# Patient Record
Sex: MALE | Race: WHITE | NOT HISPANIC OR LATINO | Employment: FULL TIME | URBAN - METROPOLITAN AREA
[De-identification: names, ages, dates, MRNs, and addresses within clinical notes are randomized per-mention and may not be internally consistent; named-entity substitution may affect disease eponyms.]

---

## 2022-12-09 ENCOUNTER — OFFICE VISIT (OUTPATIENT)
Dept: FAMILY MEDICINE | Facility: CLINIC | Age: 42
End: 2022-12-09
Payer: COMMERCIAL

## 2022-12-09 VITALS
SYSTOLIC BLOOD PRESSURE: 138 MMHG | HEART RATE: 106 BPM | OXYGEN SATURATION: 98 % | WEIGHT: 284.31 LBS | DIASTOLIC BLOOD PRESSURE: 84 MMHG | BODY MASS INDEX: 37.51 KG/M2 | TEMPERATURE: 99 F | RESPIRATION RATE: 12 BRPM

## 2022-12-09 DIAGNOSIS — E66.9 OBESITY, UNSPECIFIED CLASSIFICATION, UNSPECIFIED OBESITY TYPE, UNSPECIFIED WHETHER SERIOUS COMORBIDITY PRESENT: ICD-10-CM

## 2022-12-09 DIAGNOSIS — F17.211 CIGARETTE NICOTINE DEPENDENCE IN REMISSION: ICD-10-CM

## 2022-12-09 DIAGNOSIS — Z00.00 WELLNESS EXAMINATION: Primary | ICD-10-CM

## 2022-12-09 DIAGNOSIS — R07.89 ATYPICAL CHEST PAIN: ICD-10-CM

## 2022-12-09 DIAGNOSIS — Z23 IMMUNIZATION DUE: ICD-10-CM

## 2022-12-09 PROCEDURE — 93005 ELECTROCARDIOGRAM TRACING: CPT | Mod: S$GLB,,, | Performed by: FAMILY MEDICINE

## 2022-12-09 PROCEDURE — 99999 PR PBB SHADOW E&M-EST. PATIENT-LVL III: ICD-10-PCS | Mod: PBBFAC,,, | Performed by: FAMILY MEDICINE

## 2022-12-09 PROCEDURE — 90471 TDAP VACCINE GREATER THAN OR EQUAL TO 7YO IM: ICD-10-PCS | Mod: S$GLB,,, | Performed by: FAMILY MEDICINE

## 2022-12-09 PROCEDURE — 93010 EKG 12-LEAD: ICD-10-PCS | Mod: S$GLB,,, | Performed by: INTERNAL MEDICINE

## 2022-12-09 PROCEDURE — 93010 ELECTROCARDIOGRAM REPORT: CPT | Mod: S$GLB,,, | Performed by: INTERNAL MEDICINE

## 2022-12-09 PROCEDURE — 90715 TDAP VACCINE GREATER THAN OR EQUAL TO 7YO IM: ICD-10-PCS | Mod: S$GLB,,, | Performed by: FAMILY MEDICINE

## 2022-12-09 PROCEDURE — 99999 PR PBB SHADOW E&M-EST. PATIENT-LVL III: CPT | Mod: PBBFAC,,, | Performed by: FAMILY MEDICINE

## 2022-12-09 PROCEDURE — 93005 EKG 12-LEAD: ICD-10-PCS | Mod: S$GLB,,, | Performed by: FAMILY MEDICINE

## 2022-12-09 PROCEDURE — 99386 PREV VISIT NEW AGE 40-64: CPT | Mod: 25,S$GLB,, | Performed by: FAMILY MEDICINE

## 2022-12-09 PROCEDURE — 99386 PR PREVENTIVE VISIT,NEW,40-64: ICD-10-PCS | Mod: 25,S$GLB,, | Performed by: FAMILY MEDICINE

## 2022-12-09 PROCEDURE — 90471 IMMUNIZATION ADMIN: CPT | Mod: S$GLB,,, | Performed by: FAMILY MEDICINE

## 2022-12-09 PROCEDURE — 90715 TDAP VACCINE 7 YRS/> IM: CPT | Mod: S$GLB,,, | Performed by: FAMILY MEDICINE

## 2022-12-09 NOTE — PROGRESS NOTES
" THIS DOCUMENT WAS MADE IN PART WITH VOICE RECOGNITION SOFTWARE.  OCCASIONALLY THIS SOFTWARE WILL MISINTERPRET WORDS OR PHRASES.    Assessment and Plan:    1. Wellness examination  CBC Auto Differential    Comprehensive Metabolic Panel    Hemoglobin A1C    Lipid Panel    TSH    T4, Free    Urinalysis Microscopic    Urinalysis, Reflex to Urine Culture Urine, Clean Catch    Hepatitis C Antibody    HIV 1/2 Ag/Ab (4th Gen)    EKG 12-lead      2. Immunization due  (In Office Administered) Tdap Vaccine      3. Obesity, unspecified classification, unspecified obesity type, unspecified whether serious comorbidity present        4. Cigarette nicotine dependence in remission        5. Atypical chest pain  Exercise Stress - EKG        Wellness labs as above     Tetanus vaccine today     Exercise EKG stress test for patient with atypical chest symptoms with no medical history or recent evaluations   EKG today shows normal sinus rhythm, no concerning findings for ischemic heart disease      F/u in 2 months for blood pressure evaluation. Will bring numbers in   ______________________________________________________________________  Subjective:    Chief Complaint:  Chief Complaint   Patient presents with    Establish Care        HPI:  Yung is a 42 y.o. year old     Patient presents today for wellness visit, to establish care     Chest discomfort  Started about 1 year ago, resolved, restarted about 6 months ago   Pt suspicious about covid vaccine induced heart issues   Location : right pectoral, "felt like pulled muscle"   Not associated with physical activity, worse with anxiety / stress  Improves with massaging the area   Denies SOB / Nausea / radiation of pain   Unsure of cardiovascular family history   Has been told he had high bp         Eye pathology  History of traumatic cataract, ruptured globe, episcleritis, corneal perforation, aphakia    Tobacco Dep.   Quit 18 - 20 years ago   4 py total       Past Medical History:  Past " "Medical History:   Diagnosis Date    Aphakia of right eye     Corneal perforation 9/8/13    right eye    Episcleritis of left eye 9/25/14    Pupil irregularity     Ruptured globe of right eye 9/8/13    Traumatic cataract of right eye        Past Surgical History:  Past Surgical History:   Procedure Laterality Date    CATARACT EXTRACTION      aphakia right eye    EYE SURGERY  2013    TONSILLECTOMY, ADENOIDECTOMY  y-28       Family History:  Family History   Problem Relation Age of Onset    Cancer Mother         sarcoma    Cancer Maternal Grandmother     Cancer Paternal Grandmother     Diabetes Paternal Grandmother     Diabetes Paternal Grandfather        Social History:  Social History     Socioeconomic History    Marital status:      Spouse name: Maria L    Number of children: 3   Tobacco Use    Smoking status: Former     Packs/day: 1.00     Years: 4.00     Pack years: 4.00     Types: Cigarettes   Substance and Sexual Activity    Alcohol use: Yes     Alcohol/week: 1.0 standard drink     Types: 1 Drinks containing 0.5 oz of alcohol per week     Comment: occasionaly    Drug use: No    Sexual activity: Yes     Partners: Female     Birth control/protection: None   Social History Narrative    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     No current facility-administered medications on file prior to visit.       Allergies:  Patient has no known allergies.    Immunizations:  There is no immunization history for the selected administration types on file for this patient.    Review of Systems:  Review of Systems   All other systems reviewed and are negative.    Objective:    Vitals:  Vitals:    12/09/22 0737   BP: 138/84   Pulse: 106   Resp: 12   Temp: 98.8 °F (37.1 °C)   TempSrc: Oral   SpO2: 98%   Weight: 128.9 kg (284 lb 4.5 oz)   PainSc:   1   PainLoc: Chest       Physical " Exam  Vitals reviewed.   Constitutional:       General: He is not in acute distress.  HENT:      Head: Normocephalic and atraumatic.   Eyes:      Pupils: Pupils are equal, round, and reactive to light.   Cardiovascular:      Rate and Rhythm: Normal rate and regular rhythm.      Heart sounds: No murmur heard.    No friction rub.   Pulmonary:      Effort: Pulmonary effort is normal.      Breath sounds: Normal breath sounds.   Abdominal:      General: Bowel sounds are normal. There is no distension.      Palpations: Abdomen is soft.      Tenderness: There is no abdominal tenderness.   Musculoskeletal:      Cervical back: Neck supple.   Skin:     General: Skin is warm and dry.      Findings: No rash.   Psychiatric:         Behavior: Behavior normal.           Demarco Naranjo MD  Family Medicine

## 2022-12-12 ENCOUNTER — LAB VISIT (OUTPATIENT)
Dept: LAB | Facility: HOSPITAL | Age: 42
End: 2022-12-12
Attending: FAMILY MEDICINE
Payer: COMMERCIAL

## 2022-12-12 DIAGNOSIS — Z00.00 WELLNESS EXAMINATION: ICD-10-CM

## 2022-12-12 LAB
BASOPHILS # BLD AUTO: 0.01 K/UL (ref 0–0.2)
BASOPHILS NFR BLD: 0.2 % (ref 0–1.9)
DIFFERENTIAL METHOD: ABNORMAL
EOSINOPHIL # BLD AUTO: 0.1 K/UL (ref 0–0.5)
EOSINOPHIL NFR BLD: 2.2 % (ref 0–8)
ERYTHROCYTE [DISTWIDTH] IN BLOOD BY AUTOMATED COUNT: 11.6 % (ref 11.5–14.5)
ESTIMATED AVG GLUCOSE: 105 MG/DL (ref 68–131)
HBA1C MFR BLD: 5.3 % (ref 4–5.6)
HCT VFR BLD AUTO: 42.8 % (ref 40–54)
HGB BLD-MCNC: 14.8 G/DL (ref 14–18)
HIV 1+2 AB+HIV1 P24 AG SERPL QL IA: NORMAL
IMM GRANULOCYTES # BLD AUTO: 0.03 K/UL (ref 0–0.04)
IMM GRANULOCYTES NFR BLD AUTO: 0.5 % (ref 0–0.5)
LYMPHOCYTES # BLD AUTO: 2 K/UL (ref 1–4.8)
LYMPHOCYTES NFR BLD: 32.6 % (ref 18–48)
MCH RBC QN AUTO: 31.7 PG (ref 27–31)
MCHC RBC AUTO-ENTMCNC: 34.6 G/DL (ref 32–36)
MCV RBC AUTO: 92 FL (ref 82–98)
MONOCYTES # BLD AUTO: 0.6 K/UL (ref 0.3–1)
MONOCYTES NFR BLD: 9 % (ref 4–15)
NEUTROPHILS # BLD AUTO: 3.5 K/UL (ref 1.8–7.7)
NEUTROPHILS NFR BLD: 55.5 % (ref 38–73)
NRBC BLD-RTO: 0 /100 WBC
PLATELET # BLD AUTO: 214 K/UL (ref 150–450)
PMV BLD AUTO: 11.2 FL (ref 9.2–12.9)
RBC # BLD AUTO: 4.67 M/UL (ref 4.6–6.2)
WBC # BLD AUTO: 6.23 K/UL (ref 3.9–12.7)

## 2022-12-12 PROCEDURE — 36415 COLL VENOUS BLD VENIPUNCTURE: CPT | Mod: PN | Performed by: FAMILY MEDICINE

## 2022-12-12 PROCEDURE — 87389 HIV-1 AG W/HIV-1&-2 AB AG IA: CPT | Performed by: FAMILY MEDICINE

## 2022-12-12 PROCEDURE — 84443 ASSAY THYROID STIM HORMONE: CPT | Performed by: FAMILY MEDICINE

## 2022-12-12 PROCEDURE — 83036 HEMOGLOBIN GLYCOSYLATED A1C: CPT | Performed by: FAMILY MEDICINE

## 2022-12-12 PROCEDURE — 80061 LIPID PANEL: CPT | Performed by: FAMILY MEDICINE

## 2022-12-12 PROCEDURE — 85025 COMPLETE CBC W/AUTO DIFF WBC: CPT | Performed by: FAMILY MEDICINE

## 2022-12-12 PROCEDURE — 84439 ASSAY OF FREE THYROXINE: CPT | Performed by: FAMILY MEDICINE

## 2022-12-12 PROCEDURE — 80053 COMPREHEN METABOLIC PANEL: CPT | Performed by: FAMILY MEDICINE

## 2022-12-12 PROCEDURE — 86803 HEPATITIS C AB TEST: CPT | Performed by: FAMILY MEDICINE

## 2022-12-13 LAB
ALBUMIN SERPL BCP-MCNC: 4.3 G/DL (ref 3.5–5.2)
ALP SERPL-CCNC: 80 U/L (ref 55–135)
ALT SERPL W/O P-5'-P-CCNC: 12 U/L (ref 10–44)
ANION GAP SERPL CALC-SCNC: 9 MMOL/L (ref 8–16)
AST SERPL-CCNC: 13 U/L (ref 10–40)
BILIRUB SERPL-MCNC: 0.6 MG/DL (ref 0.1–1)
BUN SERPL-MCNC: 13 MG/DL (ref 6–20)
CALCIUM SERPL-MCNC: 9.2 MG/DL (ref 8.7–10.5)
CHLORIDE SERPL-SCNC: 105 MMOL/L (ref 95–110)
CHOLEST SERPL-MCNC: 178 MG/DL (ref 120–199)
CHOLEST/HDLC SERPL: 4 {RATIO} (ref 2–5)
CO2 SERPL-SCNC: 23 MMOL/L (ref 23–29)
CREAT SERPL-MCNC: 0.8 MG/DL (ref 0.5–1.4)
EST. GFR  (NO RACE VARIABLE): >60 ML/MIN/1.73 M^2
GLUCOSE SERPL-MCNC: 89 MG/DL (ref 70–110)
HCV AB SERPL QL IA: NORMAL
HDLC SERPL-MCNC: 45 MG/DL (ref 40–75)
HDLC SERPL: 25.3 % (ref 20–50)
LDLC SERPL CALC-MCNC: 114.2 MG/DL (ref 63–159)
NONHDLC SERPL-MCNC: 133 MG/DL
POTASSIUM SERPL-SCNC: 3.8 MMOL/L (ref 3.5–5.1)
PROT SERPL-MCNC: 7.3 G/DL (ref 6–8.4)
SODIUM SERPL-SCNC: 137 MMOL/L (ref 136–145)
T4 FREE SERPL-MCNC: 0.98 NG/DL (ref 0.71–1.51)
TRIGL SERPL-MCNC: 94 MG/DL (ref 30–150)
TSH SERPL DL<=0.005 MIU/L-ACNC: 1.91 UIU/ML (ref 0.4–4)

## 2022-12-29 ENCOUNTER — CLINICAL SUPPORT (OUTPATIENT)
Dept: CARDIOLOGY | Facility: HOSPITAL | Age: 42
End: 2022-12-29
Attending: FAMILY MEDICINE
Payer: COMMERCIAL

## 2022-12-29 VITALS — BODY MASS INDEX: 37.64 KG/M2 | HEIGHT: 73 IN | WEIGHT: 284 LBS

## 2022-12-29 DIAGNOSIS — R07.89 ATYPICAL CHEST PAIN: ICD-10-CM

## 2022-12-29 LAB
CV STRESS BASE HR: 93 BPM
DIASTOLIC BLOOD PRESSURE: 84 MMHG
OHS CV CPX 1 MINUTE RECOVERY HEART RATE: 142 BPM
OHS CV CPX 85 PERCENT MAX PREDICTED HEART RATE MALE: 153
OHS CV CPX ESTIMATED METS: 8
OHS CV CPX MAX PREDICTED HEART RATE: 180
OHS CV CPX PATIENT IS FEMALE: 0
OHS CV CPX PATIENT IS MALE: 1
OHS CV CPX PEAK DIASTOLIC BLOOD PRESSURE: 86 MMHG
OHS CV CPX PEAK HEAR RATE: 171 BPM
OHS CV CPX PEAK RATE PRESSURE PRODUCT: NORMAL
OHS CV CPX PEAK SYSTOLIC BLOOD PRESSURE: 166 MMHG
OHS CV CPX PERCENT MAX PREDICTED HEART RATE ACHIEVED: 95
OHS CV CPX RATE PRESSURE PRODUCT PRESENTING: NORMAL
STRESS ECHO POST EXERCISE DUR MIN: 5 MINUTES
STRESS ECHO POST EXERCISE DUR SEC: 39 SECONDS
SYSTOLIC BLOOD PRESSURE: 125 MMHG

## 2022-12-29 PROCEDURE — 93018 EXERCISE STRESS - EKG (CUPID ONLY): ICD-10-PCS | Mod: ,,, | Performed by: INTERNAL MEDICINE

## 2022-12-29 PROCEDURE — 93016 CV STRESS TEST SUPVJ ONLY: CPT | Mod: ,,, | Performed by: INTERNAL MEDICINE

## 2022-12-29 PROCEDURE — 93016 EXERCISE STRESS - EKG (CUPID ONLY): ICD-10-PCS | Mod: ,,, | Performed by: INTERNAL MEDICINE

## 2022-12-29 PROCEDURE — 93017 CV STRESS TEST TRACING ONLY: CPT | Mod: PO

## 2022-12-29 PROCEDURE — 93018 CV STRESS TEST I&R ONLY: CPT | Mod: ,,, | Performed by: INTERNAL MEDICINE

## 2023-02-09 ENCOUNTER — OFFICE VISIT (OUTPATIENT)
Dept: FAMILY MEDICINE | Facility: CLINIC | Age: 43
End: 2023-02-09
Payer: COMMERCIAL

## 2023-02-09 VITALS
BODY MASS INDEX: 35.97 KG/M2 | SYSTOLIC BLOOD PRESSURE: 126 MMHG | HEIGHT: 73 IN | WEIGHT: 271.38 LBS | HEART RATE: 64 BPM | DIASTOLIC BLOOD PRESSURE: 86 MMHG

## 2023-02-09 DIAGNOSIS — G47.26 SHIFT WORK SLEEP DISORDER: ICD-10-CM

## 2023-02-09 DIAGNOSIS — F41.9 ANXIETY: ICD-10-CM

## 2023-02-09 DIAGNOSIS — I10 ESSENTIAL HYPERTENSION: Primary | ICD-10-CM

## 2023-02-09 PROCEDURE — 99999 PR PBB SHADOW E&M-EST. PATIENT-LVL III: ICD-10-PCS | Mod: PBBFAC,,, | Performed by: FAMILY MEDICINE

## 2023-02-09 PROCEDURE — 99214 OFFICE O/P EST MOD 30 MIN: CPT | Mod: S$GLB,,, | Performed by: FAMILY MEDICINE

## 2023-02-09 PROCEDURE — 99214 PR OFFICE/OUTPT VISIT, EST, LEVL IV, 30-39 MIN: ICD-10-PCS | Mod: S$GLB,,, | Performed by: FAMILY MEDICINE

## 2023-02-09 PROCEDURE — 99999 PR PBB SHADOW E&M-EST. PATIENT-LVL III: CPT | Mod: PBBFAC,,, | Performed by: FAMILY MEDICINE

## 2023-02-09 RX ORDER — OLMESARTAN MEDOXOMIL 20 MG/1
20 TABLET ORAL DAILY
Qty: 90 TABLET | Refills: 3 | Status: SHIPPED | OUTPATIENT
Start: 2023-02-09 | End: 2024-02-07

## 2023-02-09 RX ORDER — TRAZODONE HYDROCHLORIDE 100 MG/1
100 TABLET ORAL NIGHTLY
Qty: 30 TABLET | Refills: 11 | Status: SHIPPED | OUTPATIENT
Start: 2023-02-09 | End: 2024-02-09

## 2023-02-09 RX ORDER — ALPRAZOLAM 0.5 MG/1
0.5 TABLET ORAL DAILY PRN
Qty: 30 TABLET | Refills: 0 | Status: SHIPPED | OUTPATIENT
Start: 2023-02-09 | End: 2023-08-10

## 2023-02-09 NOTE — PROGRESS NOTES
THIS DOCUMENT WAS MADE IN PART WITH VOICE RECOGNITION SOFTWARE.  OCCASIONALLY THIS SOFTWARE WILL MISINTERPRET WORDS OR PHRASES.    Assessment and Plan:    1. Essential hypertension  olmesartan (BENICAR) 20 MG tablet      2. Shift work sleep disorder  traZODone (DESYREL) 100 MG tablet      3. Anxiety  ALPRAZolam (XANAX) 0.5 MG tablet          PLAN    New medication trazodone 100 mg to use for insomnia     New medication Xanax to use sparingly for anxiety symptoms.  May need daily medicine if use is 2 frequent     Benicar for high blood pressure.  Follow-up in 6 months.  Maintain blood pressure log.  Counseled patient on weight loss.  If patient loses 20-24 lb, he may come off blood pressure medicine      ______________________________________________________________________  Subjective:    Chief Complaint:  Chief Complaint   Patient presents with    BP follow up        HPI:  Yung is a 42 y.o. year old     Follow-up high blood pressure readings   Noted to be 135-145 on average systolic, 85-95 average diastolic  No exertional chest symptoms   EKG exercise stress test negative.  Patient suspects anxiety as a possible cause of his chest discomfort symptoms.    Also complains of sleep pathology-insomnia  Works shift work, has trouble falling asleep sometimes as result      Eye pathology  History of traumatic cataract, ruptured globe, episcleritis, corneal perforation, aphakia     Tobacco Dep.   Quit 18 - 20 years ago   4 py total     Past Medical History:  Past Medical History:   Diagnosis Date    Aphakia of right eye     Corneal perforation 9/8/13    right eye    Episcleritis of left eye 9/25/14    Pupil irregularity     Ruptured globe of right eye 9/8/13    Traumatic cataract of right eye        Past Surgical History:  Past Surgical History:   Procedure Laterality Date    CATARACT EXTRACTION      aphakia right eye    EYE SURGERY  2013    TONSILLECTOMY, ADENOIDECTOMY  y-28       Family History:  Family History   Problem  "Relation Age of Onset    Cancer Mother         sarcoma    Cancer Maternal Grandmother     Cancer Paternal Grandmother     Diabetes Paternal Grandmother     Diabetes Paternal Grandfather        Social History:  Social History     Socioeconomic History    Marital status:      Spouse name: Maria L    Number of children: 3   Tobacco Use    Smoking status: Former     Packs/day: 1.00     Years: 4.00     Pack years: 4.00     Types: Cigarettes   Substance and Sexual Activity    Alcohol use: Yes     Alcohol/week: 1.0 standard drink     Types: 1 Drinks containing 0.5 oz of alcohol per week     Comment: occasionaly    Drug use: No    Sexual activity: Yes     Partners: Female     Birth control/protection: None   Social History Narrative    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     No current facility-administered medications on file prior to visit.       Allergies:  Patient has no known allergies.    Immunizations:  Immunization History   Administered Date(s) Administered    Tdap 12/09/2022       Review of Systems:  Review of Systems   All other systems reviewed and are negative.    Objective:    Vitals:  Vitals:    02/09/23 1400   BP: 126/86   Pulse: 64   Weight: 123.1 kg (271 lb 6.2 oz)   Height: 6' 1" (1.854 m)   PainSc: 0-No pain       Physical Exam  Vitals reviewed.   Constitutional:       General: He is not in acute distress.  HENT:      Head: Normocephalic and atraumatic.   Eyes:      Pupils: Pupils are equal, round, and reactive to light.   Cardiovascular:      Rate and Rhythm: Normal rate and regular rhythm.      Heart sounds: No murmur heard.    No friction rub.   Pulmonary:      Effort: Pulmonary effort is normal.      Breath sounds: Normal breath sounds.   Abdominal:      General: Bowel sounds are normal. There is no distension.      Palpations: Abdomen is soft.      " Tenderness: There is no abdominal tenderness.   Musculoskeletal:      Cervical back: Neck supple.   Skin:     General: Skin is warm and dry.      Findings: No rash.   Psychiatric:         Behavior: Behavior normal.           Demarco Naranjo MD  Family Medicine

## 2023-06-15 ENCOUNTER — TELEPHONE (OUTPATIENT)
Dept: OPHTHALMOLOGY | Facility: CLINIC | Age: 43
End: 2023-06-15
Payer: COMMERCIAL

## 2023-06-15 ENCOUNTER — OFFICE VISIT (OUTPATIENT)
Dept: OPHTHALMOLOGY | Facility: CLINIC | Age: 43
End: 2023-06-15
Payer: COMMERCIAL

## 2023-06-15 ENCOUNTER — OFFICE VISIT (OUTPATIENT)
Dept: OPTOMETRY | Facility: CLINIC | Age: 43
End: 2023-06-15
Payer: COMMERCIAL

## 2023-06-15 DIAGNOSIS — H33.21 RETINAL DETACHMENT, RIGHT: Primary | ICD-10-CM

## 2023-06-15 DIAGNOSIS — H33.011 RETINAL DETACHMENT OF RIGHT EYE WITH SINGLE BREAK: ICD-10-CM

## 2023-06-15 PROCEDURE — 92014 COMPRE OPH EXAM EST PT 1/>: CPT | Mod: S$GLB,,, | Performed by: OPHTHALMOLOGY

## 2023-06-15 PROCEDURE — 92014 PR EYE EXAM, EST PATIENT,COMPREHESV: ICD-10-PCS | Mod: S$GLB,,, | Performed by: OPHTHALMOLOGY

## 2023-06-15 PROCEDURE — 99999 PR PBB SHADOW E&M-EST. PATIENT-LVL III: ICD-10-PCS | Mod: PBBFAC,,, | Performed by: OPHTHALMOLOGY

## 2023-06-15 PROCEDURE — 99999 PR PBB SHADOW E&M-EST. PATIENT-LVL III: ICD-10-PCS | Mod: PBBFAC,,, | Performed by: OPTOMETRIST

## 2023-06-15 PROCEDURE — 99999 PR PBB SHADOW E&M-EST. PATIENT-LVL III: CPT | Mod: PBBFAC,,, | Performed by: OPTOMETRIST

## 2023-06-15 PROCEDURE — 92004 COMPRE OPH EXAM NEW PT 1/>: CPT | Mod: S$GLB,,, | Performed by: OPTOMETRIST

## 2023-06-15 PROCEDURE — 99999 PR PBB SHADOW E&M-EST. PATIENT-LVL III: CPT | Mod: PBBFAC,,, | Performed by: OPHTHALMOLOGY

## 2023-06-15 PROCEDURE — 92004 PR EYE EXAM, NEW PATIENT,COMPREHESV: ICD-10-PCS | Mod: S$GLB,,, | Performed by: OPTOMETRIST

## 2023-06-15 RX ORDER — AMOXICILLIN 875 MG/1
875 TABLET, FILM COATED ORAL EVERY 12 HOURS
COMMUNITY
Start: 2023-06-08 | End: 2023-08-10

## 2023-06-15 NOTE — TELEPHONE ENCOUNTER
Pt states 1 wk ago had pain flashes and weird vision.  Yesterday bottom large black spot 1/4 of VA dark, this morning 1/2 of vision waving and dark.  Pt told to come in immediately.         ----- Message from Blanca Liang sent at 6/15/2023  2:20 PM CDT -----  Regarding: Same Day Appointment Request  Contact: patient at [phone]  Type:  Same Day Appointment Request    Caller is requesting a same day appointment.  Caller declined first available appointment listed below.      Name of Caller:  patient at [phone]    When is the first available appointment?  6/21  Symptoms:  loss of vision in right eye    Additional Information:   patient would like to be seen before the weekend. Please call and advise. Thank you\

## 2023-06-15 NOTE — PROGRESS NOTES
HPI    DLS about 6mos ago    Pt states 2 wks ago had pain  with flashes around a [place].  Couple of   days later noticed a big floater. But all went away.  Yesterday saw a dark   spot 1/4 lower area with flashes. This am 1/2 of VA is gone, with the area   moving around.       Previous to this no flashes noted and did not extreme exertion.   Last edited by Paulina Jackson on 6/15/2023  3:39 PM.            Assessment /Plan     For exam results, see Encounter Report.    Retinal detachment, right      Large superior x 1 day history of signif vision loss, symptoms started 1 week ago, RTC with retina for eval

## 2023-06-15 NOTE — PROGRESS NOTES
HPI    RD Per Dr Mello      OCT - OD - bullous RD over fovea -        Prior OCT nerve - some infero temporal thinning OS    Prior HVF - Full, though fixation losses OD due to aphakia    A/P      1-25g PPV/PPL/open globe revision OD 9/11/13   -For Traumatic cataract and presumed IOFB.  No IOFB found    6/23 - 1 day h/o inferior visual field loss  Bullous ST RD with shallow temporal macular SRF    Plan urgent repair - good pneumatic candidate as prior PPV  Will discuss with Dr. Laguerre.        2-Ocular hypertension OS:   In past - had JDN eval    3-Aphakic contact lens OD:   -20/40 BCVA in past with toric CL

## 2023-06-16 ENCOUNTER — OFFICE VISIT (OUTPATIENT)
Dept: OPHTHALMOLOGY | Facility: CLINIC | Age: 43
End: 2023-06-16
Payer: COMMERCIAL

## 2023-06-16 DIAGNOSIS — H27.01 APHAKIA OF RIGHT EYE: ICD-10-CM

## 2023-06-16 DIAGNOSIS — H33.011 RETINAL DETACHMENT OF RIGHT EYE WITH SINGLE BREAK: Primary | ICD-10-CM

## 2023-06-16 DIAGNOSIS — H40.052 OCULAR HYPERTENSION OF LEFT EYE: ICD-10-CM

## 2023-06-16 PROCEDURE — 67110 REPAIR DETACHED RETINA: CPT | Mod: RT,S$GLB,, | Performed by: OPHTHALMOLOGY

## 2023-06-16 PROCEDURE — 92201 PR OPHTHALMOSCOPY, EXT, W/RET DRAW/SCLERAL DEPR, I&R, UNI/BI: ICD-10-PCS | Mod: 59,S$GLB,, | Performed by: OPHTHALMOLOGY

## 2023-06-16 PROCEDURE — 92134 CPTRZ OPH DX IMG PST SGM RTA: CPT | Mod: S$GLB,,, | Performed by: OPHTHALMOLOGY

## 2023-06-16 PROCEDURE — 67208 PR DESTRUC RETINAL LESN,CRYOTHERAPY: ICD-10-PCS | Mod: 51,RT,S$GLB, | Performed by: OPHTHALMOLOGY

## 2023-06-16 PROCEDURE — 99215 OFFICE O/P EST HI 40 MIN: CPT | Mod: 57,S$GLB,, | Performed by: OPHTHALMOLOGY

## 2023-06-16 PROCEDURE — 92201 OPSCPY EXTND RTA DRAW UNI/BI: CPT | Mod: 59,S$GLB,, | Performed by: OPHTHALMOLOGY

## 2023-06-16 PROCEDURE — 67208 TREATMENT OF RETINAL LESION: CPT | Mod: 51,RT,S$GLB, | Performed by: OPHTHALMOLOGY

## 2023-06-16 PROCEDURE — 99999 PR PBB SHADOW E&M-EST. PATIENT-LVL III: CPT | Mod: PBBFAC,,, | Performed by: OPHTHALMOLOGY

## 2023-06-16 PROCEDURE — 99999 PR PBB SHADOW E&M-EST. PATIENT-LVL III: ICD-10-PCS | Mod: PBBFAC,,, | Performed by: OPHTHALMOLOGY

## 2023-06-16 PROCEDURE — 67110 PR REPAIR DETACH RETINA,INJECT AIR/GAS: ICD-10-PCS | Mod: RT,S$GLB,, | Performed by: OPHTHALMOLOGY

## 2023-06-16 PROCEDURE — 99215 PR OFFICE/OUTPT VISIT, EST, LEVL V, 40-54 MIN: ICD-10-PCS | Mod: 57,S$GLB,, | Performed by: OPHTHALMOLOGY

## 2023-06-16 PROCEDURE — 92134 OCT, RETINA - OU - BOTH EYES: ICD-10-PCS | Mod: S$GLB,,, | Performed by: OPHTHALMOLOGY

## 2023-06-16 RX ORDER — DORZOLAMIDE HYDROCHLORIDE AND TIMOLOL MALEATE 20; 5 MG/ML; MG/ML
1 SOLUTION/ DROPS OPHTHALMIC 2 TIMES DAILY
Qty: 10 ML | Refills: 1 | Status: SHIPPED | OUTPATIENT
Start: 2023-06-16 | End: 2023-08-10

## 2023-06-16 RX ORDER — ACETAMINOPHEN 500 MG
500 TABLET ORAL
Status: SHIPPED | OUTPATIENT
Start: 2023-06-16

## 2023-06-16 RX ORDER — PREDNISOLONE ACETATE 10 MG/ML
1 SUSPENSION/ DROPS OPHTHALMIC EVERY 4 HOURS
Qty: 5 ML | Refills: 0 | Status: SHIPPED | OUTPATIENT
Start: 2023-06-16 | End: 2023-08-10

## 2023-06-16 RX ORDER — OFLOXACIN 3 MG/ML
1 SOLUTION/ DROPS OPHTHALMIC EVERY 4 HOURS
Qty: 5 ML | Refills: 0 | Status: SHIPPED | OUTPATIENT
Start: 2023-06-16 | End: 2023-06-26

## 2023-06-16 RX ORDER — ACETAMINOPHEN 500 MG
1000 TABLET ORAL EVERY 6 HOURS PRN
Qty: 100 TABLET | Refills: 2 | Status: ON HOLD | OUTPATIENT
Start: 2023-06-16 | End: 2023-08-07 | Stop reason: HOSPADM

## 2023-06-16 NOTE — PROGRESS NOTES
HPI    New Patient- ref by Dr. Mello/Elizabeth    RD OD    Patient here today with c/o obstructed VA OD, no pain, floaters and   flashes OD.  Last edited by Ruth Ann Rosado on 6/16/2023  9:07 AM.         A/P    ICD-10-CM ICD-9-CM   1. Retinal detachment of right eye with single break  H33.011 361.01   2. Aphakia of right eye  H27.01 379.31   3. Ocular hypertension of left eye  H40.052 365.04       1. Retinal detachment of right eye with single break  2. Aphakia of right eye  Hx of globe injury (glass injury?), s/p PPV/PPL/open globe revision OD (2013)  Presents with 1-2 days of blurred vision and finding of mac off RD  VA HM, bullous detachment with focal break at 10oclock with      Plan: discussed options of vitrectomy versus cryopexy/pneumatic retinopexy , pt wishes to proceed with cryopexy/pneumatic retinopexy .    Based on todays exam, diagnostic studies, and review of records, the determination was made for treatment today.  Schedulecryopexy/pneumatic retinopexy today Right Eye Patient chooses to proceed with R/B/A discussed patient elects to proceed    Procedure Note: cryopexy/pneumatic retinopexy  Right eye    Time out performed and patient and site identified    Preoperative Diagnosis: Rhegmatogenous Retinal Detachment Right Eye  Anesthesia: topical  Prep: Topical properacaine then 10% Betadine to lids, lashes, conjunctiva  Subconjunctival Lidocaine 2%    A/C paracentesis performed with Tb syringe and 30g needle.  Then 100% C3F8 gas, 0.5 cc, injected 3.5-4.0 mm posterior to limbus inferotemporally.  IOP acceptable, central retinal artery patent, ONH perfused     The patient remained face down for 1 hour and then Cryotherapy was applied to breaks under indirect ophthalmoscope visualization    Post op diagnosis: same  Complications: none  Follow-up Monday 9 AM    Start PF/Ocuflox QID  FACE DOWN UNTIL MONDAY    3. Ocular hypertension of left eye  Hx elevated IOP, today 18  Plan: Observation off gtt    RTC  Monday 9 AM DFE OD, possible laser OD    I saw and examined the patient and reviewed in detail the findings documented. The final examination findings, image interpretations, and plan as documented in the record represent my personal judgment and conclusions.    Han Laguerre MD  Vitreoretinal Surgery   [facility]

## 2023-06-19 ENCOUNTER — OFFICE VISIT (OUTPATIENT)
Dept: OPHTHALMOLOGY | Facility: CLINIC | Age: 43
End: 2023-06-19
Payer: COMMERCIAL

## 2023-06-19 ENCOUNTER — PATIENT MESSAGE (OUTPATIENT)
Dept: OPHTHALMOLOGY | Facility: CLINIC | Age: 43
End: 2023-06-19

## 2023-06-19 DIAGNOSIS — H33.011 RETINAL DETACHMENT OF RIGHT EYE WITH SINGLE BREAK: Primary | ICD-10-CM

## 2023-06-19 PROCEDURE — 99024 POSTOP FOLLOW-UP VISIT: CPT | Mod: S$GLB,,, | Performed by: OPHTHALMOLOGY

## 2023-06-19 PROCEDURE — 99999 PR PBB SHADOW E&M-EST. PATIENT-LVL II: ICD-10-PCS | Mod: PBBFAC,,, | Performed by: OPHTHALMOLOGY

## 2023-06-19 PROCEDURE — 99024 PR POST-OP FOLLOW-UP VISIT: ICD-10-PCS | Mod: S$GLB,,, | Performed by: OPHTHALMOLOGY

## 2023-06-19 PROCEDURE — 99999 PR PBB SHADOW E&M-EST. PATIENT-LVL II: CPT | Mod: PBBFAC,,, | Performed by: OPHTHALMOLOGY

## 2023-06-19 NOTE — PROGRESS NOTES
HPI    DLS: 06/16/2023 for retinal detachment  right eye  with single break   Had cryopexy/pneumatic retinopexy OD same day.    Pt returns today for follow up     Current medication: Pf every 4 hours, Oflocacin 0.3% every 4 hours   And Cosopt bid OD        Patient reports that his vision is still very blurry but he can see light.   And OD is only sensitive to touch  Last edited by Marley White on 6/19/2023  8:59 AM.         A/P    ICD-10-CM ICD-9-CM   1. Retinal detachment of right eye with single break  H33.011 361.01       1. Retinal detachment of right eye with single break  2. Aphakia of right eye  Hx of globe injury (glass injury?), s/p PPV/PPL/open globe revision OD (2013)    Initial Visit: Presents with 1-2 days of blurred vision and finding of mac off RD  VA HM, bullous detachment with focal break at 10oclock with      S/p Cryo/Pneumo 6/16/23    Today 6/19/2023   VA CF aphakic, IOP good, good gas fill, retina flat at break and macula attached, early cryo changes at 10oclock, inferior displaced SRF no new RT/RD    Given some early cryo changes and how bullous RD was, recommend laser retinopexy to supplement retinal break   Based on todays exam, diagnostic studies, and review of records, the determination was made for treatment today.  Schedule laser retinopexy today Right Eye Patient chooses to proceed with laser R/B/A discussed patient elects to proceed    Patient identified.  Timeout performed.    Laser Procedure Note  Laser retinopexy Right Eye  Anesthesia: topical Proparacaine  Complications: none  Size: 200 microns  Duration: 80 ms  Power: 300  Number: 432  Good laser uptake, no complications  F/u as above, RTC sooner PRN       Can d/c PF/Ocuflox and cosopt  Head upright tilted to left       RD precautions discussed in detail, patient expressed understanding  RTC immediately PRN (especially ANY change flashes, floaters, vision, visual field)       3. Ocular hypertension of left eye  Hx  elevated IOP, today 15  Plan: Observation off gtt    RTC 1 week DFE/OCTm OD     I saw and examined the patient and reviewed in detail the findings documented. The final examination findings, image interpretations, and plan as documented in the record represent my personal judgment and conclusions.    Han Laguerre MD  Vitreoretinal Surgery   [facility]

## 2023-06-20 ENCOUNTER — PATIENT MESSAGE (OUTPATIENT)
Dept: OPHTHALMOLOGY | Facility: CLINIC | Age: 43
End: 2023-06-20
Payer: COMMERCIAL

## 2023-07-05 NOTE — PROGRESS NOTES
HPI     1 month f/u  RD  OD     Additional comments: RD  IN OD    CORNEAL PERFORATION  OD  TRAMATIC CATARACT OD  PT ONLY WANTED OD CHECKED AND DILATION HE DROVE HIMSELF     EYE MEDS   NONE           Comments    DLS    06/19/2023      POHX   APHAKIA OD   RD OD  S/P PNEUMO/CRYO  06/16/2023  HM  FOCAL BREAK LAST VISIT     OCT  TODAY   DFE OD            Last edited by Kassy Zaragoza on 7/6/2023  1:19 PM.         A/P    ICD-10-CM ICD-9-CM   1. Retinal detachment of right eye with single break  H33.011 361.01   2. Aphakia of right eye  H27.01 379.31   3. Ocular hypertension of left eye  H40.052 365.04         1. Retinal detachment of right eye with single break  2. Aphakia of right eye  Hx of globe injury (glass injury?), s/p PPV/PPL/open globe revision OD (2013)    Initial Visit: Presents with 1-2 days of blurred vision and finding of mac off RD  VA HM, bullous detachment with focal break at 10oclock with      S/p Cryo/Pneumo 6/16/23  S/p supplemental laser 6/19/23    Today 7/5/2023   VA CF (stable), IOP 16, retina detached, break at 10oclock slightly elevated with cryo changes, no new RT/RD with     Plan: discussed options of vitrectomy vs repeat pneumatic/cryo, pt wishes to proceed with pneumatic/cryo    Based on todays exam, diagnostic studies, and review of records, the determination was made for treatment today.  Schedule Pneumatic Retinopexy/Cryopexy today Right Eye Patient chooses to proceed with R/B/A discussed patient elects to proceed        Procedure Note: Pneumatic Retinopexy, Right eye    Preoperative Diagnosis: Rhegmatogenous Retinal Detachment Right Eye  Anesthesia: topical  Prep: Topical properacaine then 10% Betadine to lids, lashes, conjunctiva  Subconjunctival Lidocaine 2%  Cryotherapy applied to breaks under indirect ophthalmoscope visualization  A/C paracentesis performed with Tb syringe and 30g needle.  Then 100% C3F8 gas, 0.4 cc, injected 3.5-4.0 mm posterior to limbus inferotemporally.  IOP  26, central retinal artery patent, ONH perfused after procedure.  Post op diagnosis: same  Complications: none  Follow-up Monday     FACE DOWN        Restart PF/Ocuflox          RD precautions discussed in detail, patient expressed understanding  RTC immediately PRN (especially ANY change flashes, floaters, vision, visual field)       3. Ocular hypertension of left eye  Hx elevated IOP, today 15  Plan: Observation off gtt    RTC Monday DFE/OCTm OD      I saw and examined the patient and reviewed in detail the findings documented. The final examination findings, image interpretations, and plan as documented in the record represent my personal judgment and conclusions.    Han Laguerre MD  Vitreoretinal Surgery   [facility]

## 2023-07-06 ENCOUNTER — OFFICE VISIT (OUTPATIENT)
Dept: OPHTHALMOLOGY | Facility: CLINIC | Age: 43
End: 2023-07-06
Payer: COMMERCIAL

## 2023-07-06 DIAGNOSIS — H27.01 APHAKIA OF RIGHT EYE: ICD-10-CM

## 2023-07-06 DIAGNOSIS — H33.011 RETINAL DETACHMENT OF RIGHT EYE WITH SINGLE BREAK: Primary | ICD-10-CM

## 2023-07-06 DIAGNOSIS — H40.052 OCULAR HYPERTENSION OF LEFT EYE: ICD-10-CM

## 2023-07-06 PROCEDURE — 99024 PR POST-OP FOLLOW-UP VISIT: ICD-10-PCS | Mod: S$GLB,,, | Performed by: OPHTHALMOLOGY

## 2023-07-06 PROCEDURE — 92134 CPTRZ OPH DX IMG PST SGM RTA: CPT | Mod: S$GLB,,, | Performed by: OPHTHALMOLOGY

## 2023-07-06 PROCEDURE — 67110 REPAIR DETACHED RETINA: CPT | Mod: 58,RT,S$GLB, | Performed by: OPHTHALMOLOGY

## 2023-07-06 PROCEDURE — 99999 PR PBB SHADOW E&M-EST. PATIENT-LVL III: CPT | Mod: PBBFAC,,, | Performed by: OPHTHALMOLOGY

## 2023-07-06 PROCEDURE — 67110 PR REPAIR DETACH RETINA,INJECT AIR/GAS: ICD-10-PCS | Mod: 58,RT,S$GLB, | Performed by: OPHTHALMOLOGY

## 2023-07-06 PROCEDURE — 99999 PR PBB SHADOW E&M-EST. PATIENT-LVL III: ICD-10-PCS | Mod: PBBFAC,,, | Performed by: OPHTHALMOLOGY

## 2023-07-06 PROCEDURE — 99024 POSTOP FOLLOW-UP VISIT: CPT | Mod: S$GLB,,, | Performed by: OPHTHALMOLOGY

## 2023-07-06 PROCEDURE — 92134 OCT, RETINA - OU - BOTH EYES: ICD-10-PCS | Mod: S$GLB,,, | Performed by: OPHTHALMOLOGY

## 2023-07-08 ENCOUNTER — PATIENT MESSAGE (OUTPATIENT)
Dept: OPHTHALMOLOGY | Facility: CLINIC | Age: 43
End: 2023-07-08
Payer: COMMERCIAL

## 2023-07-10 ENCOUNTER — TELEPHONE (OUTPATIENT)
Dept: OPHTHALMOLOGY | Facility: CLINIC | Age: 43
End: 2023-07-10
Payer: COMMERCIAL

## 2023-07-10 ENCOUNTER — OFFICE VISIT (OUTPATIENT)
Dept: OPHTHALMOLOGY | Facility: CLINIC | Age: 43
End: 2023-07-10
Payer: COMMERCIAL

## 2023-07-10 DIAGNOSIS — H40.052 OCULAR HYPERTENSION OF LEFT EYE: ICD-10-CM

## 2023-07-10 DIAGNOSIS — H27.01 APHAKIA OF RIGHT EYE: ICD-10-CM

## 2023-07-10 DIAGNOSIS — H33.011 RETINAL DETACHMENT OF RIGHT EYE WITH SINGLE BREAK: Primary | ICD-10-CM

## 2023-07-10 PROCEDURE — 92134 OCT, RETINA - OU - BOTH EYES: ICD-10-PCS | Mod: S$GLB,,, | Performed by: OPHTHALMOLOGY

## 2023-07-10 PROCEDURE — 99999 PR PBB SHADOW E&M-EST. PATIENT-LVL III: CPT | Mod: PBBFAC,,, | Performed by: OPHTHALMOLOGY

## 2023-07-10 PROCEDURE — 92134 CPTRZ OPH DX IMG PST SGM RTA: CPT | Mod: S$GLB,,, | Performed by: OPHTHALMOLOGY

## 2023-07-10 PROCEDURE — 99024 POSTOP FOLLOW-UP VISIT: CPT | Mod: S$GLB,,, | Performed by: OPHTHALMOLOGY

## 2023-07-10 PROCEDURE — 99999 PR PBB SHADOW E&M-EST. PATIENT-LVL III: ICD-10-PCS | Mod: PBBFAC,,, | Performed by: OPHTHALMOLOGY

## 2023-07-10 PROCEDURE — 99024 PR POST-OP FOLLOW-UP VISIT: ICD-10-PCS | Mod: S$GLB,,, | Performed by: OPHTHALMOLOGY

## 2023-07-10 NOTE — PROGRESS NOTES
HPI    1 wk DFE OD / OCTm     DLS  07/06/2023 by Dr. JASON Laguerre MD      CC: pt reports OD is a little sore since Cryo procedure.  I due have   fun-house appearance OD     --diplopia  --flashes/--floaters  ++headaches qna  -curtain/shadows/veils      POHX:    1. APHAKIA OD     2. RD OD  S/P PNEUMO/CRYO  06/16/2023    3. HM  FOCAL BREAK LAST VISIT          Last edited by Dominique Barker MA on 7/10/2023 12:25 PM.           A/P    ICD-10-CM ICD-9-CM   1. Retinal detachment of right eye with single break  H33.011 361.01   2. Aphakia of right eye  H27.01 379.31   3. Ocular hypertension of left eye  H40.052 365.04       1. Retinal detachment of right eye with single break  2. Aphakia of right eye  Hx of globe injury (glass injury?), s/p PPV/PPL/open globe revision OD (2013)    Initial Visit: Presents with 1-2 days of blurred vision and finding of mac off RD  VA HM, bullous detachment with focal break at 10oclock with      S/p Cryo/Pneumo 6/16/23  S/p supplemental laser 6/19/23  S/p Cryo/Pneumo 7/6/23    Today 7/10/2023   VA CF (stable), IOP 18, macula flat, break at 10oclock FLAT with cryo changes, no new RT/RD with , displaced SRF inf no new RT/RD    Plan:   observe, good gas fill, retina improved, continue Head tilted to left slightly        Can stop PF/Ocuflox          RD precautions discussed in detail, patient expressed understanding  RTC immediately PRN (especially ANY change flashes, floaters, vision, visual field)       3. Ocular hypertension of left eye  Hx elevated IOP  Plan: Observation off gtt    RTC Wed DFE  OD      I saw and examined the patient and reviewed in detail the findings documented. The final examination findings, image interpretations, and plan as documented in the record represent my personal judgment and conclusions.    Han Laguerre MD  Vitreoretinal Surgery   [facility]

## 2023-07-10 NOTE — TELEPHONE ENCOUNTER
Spoke to pt and scheduled appt       ----- Message from Anaid Mcnamara sent at 7/10/2023  1:02 PM CDT -----  Regarding: appt  Dr. Laguerre would like to sept again 7/12/23. DFE OS.

## 2023-07-12 ENCOUNTER — OFFICE VISIT (OUTPATIENT)
Dept: OPHTHALMOLOGY | Facility: CLINIC | Age: 43
End: 2023-07-12
Payer: COMMERCIAL

## 2023-07-12 DIAGNOSIS — H27.01 APHAKIA OF RIGHT EYE: ICD-10-CM

## 2023-07-12 DIAGNOSIS — H40.052 OCULAR HYPERTENSION OF LEFT EYE: ICD-10-CM

## 2023-07-12 DIAGNOSIS — H33.011 RETINAL DETACHMENT OF RIGHT EYE WITH SINGLE BREAK: Primary | ICD-10-CM

## 2023-07-12 PROCEDURE — 99024 POSTOP FOLLOW-UP VISIT: CPT | Mod: S$GLB,,, | Performed by: OPHTHALMOLOGY

## 2023-07-12 PROCEDURE — 99999 PR PBB SHADOW E&M-EST. PATIENT-LVL III: ICD-10-PCS | Mod: PBBFAC,,, | Performed by: OPHTHALMOLOGY

## 2023-07-12 PROCEDURE — 99999 PR PBB SHADOW E&M-EST. PATIENT-LVL III: CPT | Mod: PBBFAC,,, | Performed by: OPHTHALMOLOGY

## 2023-07-12 PROCEDURE — 99024 PR POST-OP FOLLOW-UP VISIT: ICD-10-PCS | Mod: S$GLB,,, | Performed by: OPHTHALMOLOGY

## 2023-07-12 NOTE — PROGRESS NOTES
HPI    Pt here for 1 wk DFE    States OD is improved in vision, slight pain off and on 2/10.  Off gtts as instructed  Denies headaches, floaters/flashes  Last edited by Jenny Jacobson on 7/12/2023  9:12 AM.         A/P    ICD-10-CM ICD-9-CM   1. Retinal detachment of right eye with single break  H33.011 361.01   2. Aphakia of right eye  H27.01 379.31   3. Ocular hypertension of left eye  H40.052 365.04         1. Retinal detachment of right eye with single break  2. Aphakia of right eye  Hx of globe injury (glass injury?), s/p PPV/PPL/open globe revision OD (2013)    Initial Visit: Presents with 1-2 days of blurred vision and finding of mac off RD  VA HM, bullous detachment with focal break at 10oclock with      S/p Cryo/Pneumo 6/16/23  S/p supplemental laser 6/19/23  S/p Cryo/Pneumo 7/6/23    Today 7/12/2023   VA CF (stable), IOP 21, macula flat, break at 10oclock FLAT with cryo changes, no new RT/RD , improving displaced SRF inf no new RT/RD    Plan:   observe, good gas fill, retina improved, continue Head tilted to left slightly       RD precautions discussed in detail, patient expressed understanding  RTC immediately PRN (especially ANY change flashes, floaters, vision, visual field)       3. Ocular hypertension of left eye  Hx elevated IOP  Plan: Observation off gtt      RTC 1 week DFE  OD      I saw and examined the patient and reviewed in detail the findings documented. The final examination findings, image interpretations, and plan as documented in the record represent my personal judgment and conclusions.    Han Laguerre MD  Vitreoretinal Surgery   [facility]

## 2023-07-19 ENCOUNTER — OFFICE VISIT (OUTPATIENT)
Dept: OPHTHALMOLOGY | Facility: CLINIC | Age: 43
End: 2023-07-19
Payer: COMMERCIAL

## 2023-07-19 DIAGNOSIS — H27.01 APHAKIA OF RIGHT EYE: ICD-10-CM

## 2023-07-19 DIAGNOSIS — H33.011 RETINAL DETACHMENT OF RIGHT EYE WITH SINGLE BREAK: Primary | ICD-10-CM

## 2023-07-19 DIAGNOSIS — H40.052 OCULAR HYPERTENSION OF LEFT EYE: ICD-10-CM

## 2023-07-19 PROCEDURE — 99999 PR PBB SHADOW E&M-EST. PATIENT-LVL II: CPT | Mod: PBBFAC,,, | Performed by: OPHTHALMOLOGY

## 2023-07-19 PROCEDURE — 99999 PR PBB SHADOW E&M-EST. PATIENT-LVL II: ICD-10-PCS | Mod: PBBFAC,,, | Performed by: OPHTHALMOLOGY

## 2023-07-19 PROCEDURE — 99024 POSTOP FOLLOW-UP VISIT: CPT | Mod: S$GLB,,, | Performed by: OPHTHALMOLOGY

## 2023-07-19 PROCEDURE — 99024 PR POST-OP FOLLOW-UP VISIT: ICD-10-PCS | Mod: S$GLB,,, | Performed by: OPHTHALMOLOGY

## 2023-07-19 NOTE — PROGRESS NOTES
HPI    Pt presents for one week post op following RD repair OD     States no new ocular complaints    No ocular meds     Last edited by Lawanda Pederson on 7/19/2023  1:19 PM.          A/P    ICD-10-CM ICD-9-CM   1. Retinal detachment of right eye with single break  H33.011 361.01   2. Aphakia of right eye  H27.01 379.31   3. Ocular hypertension of left eye  H40.052 365.04           1. Retinal detachment of right eye with single break  2. Aphakia of right eye  Hx of globe injury (glass injury?), s/p PPV/PPL/open globe revision OD (2013)    Initial Visit: Presents with 1-2 days of blurred vision and finding of mac off RD  VA HM, bullous detachment with focal break at 10oclock with      S/p Cryo/Pneumo 6/16/23  S/p supplemental laser 6/19/23  S/p Cryo/Pneumo 7/6/23    Today 7/19/2023   VA CF (stable), IOP 21, macula flat, break at 10oclock FLAT with cryo changes, no new RT/RD , better inf displaced SRF inf no new RT/RD    Plan:   observe, good gas fill, retina improved, continue Head tilted to left slightly       RD precautions discussed in detail, patient expressed understanding  RTC immediately PRN (especially ANY change flashes, floaters, vision, visual field)       3. Ocular hypertension of left eye  Hx elevated IOP  Plan: Observation off gtt      RTC 2 week DFE  OD      I saw and examined the patient and reviewed in detail the findings documented. The final examination findings, image interpretations, and plan as documented in the record represent my personal judgment and conclusions.    Han Laguerre MD  Vitreoretinal Surgery   [facility]

## 2023-08-02 ENCOUNTER — OFFICE VISIT (OUTPATIENT)
Dept: OPHTHALMOLOGY | Facility: CLINIC | Age: 43
End: 2023-08-02
Payer: COMMERCIAL

## 2023-08-02 DIAGNOSIS — H33.011 RETINAL DETACHMENT OF RIGHT EYE WITH SINGLE BREAK: Primary | ICD-10-CM

## 2023-08-02 DIAGNOSIS — H40.052 OCULAR HYPERTENSION OF LEFT EYE: ICD-10-CM

## 2023-08-02 DIAGNOSIS — H27.01 APHAKIA OF RIGHT EYE: ICD-10-CM

## 2023-08-02 PROCEDURE — 92134 OCT, RETINA - OU - BOTH EYES: ICD-10-PCS | Mod: S$GLB,,, | Performed by: OPHTHALMOLOGY

## 2023-08-02 PROCEDURE — 99999 PR PBB SHADOW E&M-EST. PATIENT-LVL III: CPT | Mod: PBBFAC,,, | Performed by: OPHTHALMOLOGY

## 2023-08-02 PROCEDURE — 99214 PR OFFICE/OUTPT VISIT, EST, LEVL IV, 30-39 MIN: ICD-10-PCS | Mod: 24,S$GLB,, | Performed by: OPHTHALMOLOGY

## 2023-08-02 PROCEDURE — 92134 CPTRZ OPH DX IMG PST SGM RTA: CPT | Mod: S$GLB,,, | Performed by: OPHTHALMOLOGY

## 2023-08-02 PROCEDURE — 99999 PR PBB SHADOW E&M-EST. PATIENT-LVL III: ICD-10-PCS | Mod: PBBFAC,,, | Performed by: OPHTHALMOLOGY

## 2023-08-02 PROCEDURE — 99214 OFFICE O/P EST MOD 30 MIN: CPT | Mod: 24,S$GLB,, | Performed by: OPHTHALMOLOGY

## 2023-08-02 RX ORDER — MOXIFLOXACIN 5 MG/ML
1 SOLUTION/ DROPS OPHTHALMIC
Status: CANCELLED | OUTPATIENT
Start: 2023-08-02

## 2023-08-02 RX ORDER — TETRACAINE HYDROCHLORIDE 5 MG/ML
1 SOLUTION OPHTHALMIC
Status: CANCELLED | OUTPATIENT
Start: 2023-08-02

## 2023-08-02 RX ORDER — CYCLOPENTOLATE HYDROCHLORIDE 10 MG/ML
1 SOLUTION/ DROPS OPHTHALMIC
Status: CANCELLED | OUTPATIENT
Start: 2023-08-02

## 2023-08-02 RX ORDER — PHENYLEPHRINE HYDROCHLORIDE 25 MG/ML
1 SOLUTION/ DROPS OPHTHALMIC
Status: CANCELLED | OUTPATIENT
Start: 2023-08-02

## 2023-08-02 RX ORDER — PREDNISOLONE ACETATE 10 MG/ML
1 SUSPENSION/ DROPS OPHTHALMIC
Status: CANCELLED | OUTPATIENT
Start: 2023-08-02

## 2023-08-02 NOTE — PROGRESS NOTES
HPI    Pt presents for 2 week dfe/oct OD     States vision has been deteriorating over the past week     No ocular meds     Last edited by Lawanda Pederson on 8/2/2023  8:53 AM.          A/P    ICD-10-CM ICD-9-CM   1. Retinal detachment of right eye with single break  H33.011 361.01   2. Aphakia of right eye  H27.01 379.31   3. Ocular hypertension of left eye  H40.052 365.04       1. Retinal detachment of right eye with single break  2. Aphakia of right eye  Hx of globe injury (glass injury?), s/p PPV/PPL/open globe revision OD (2013)    Initial Visit: Presents with 1-2 days of blurred vision and finding of mac off RD  [facility] HM, bullous detachment with focal break at 10oclock with      S/p Cryo/Pneumo 6/16/23  S/p supplemental laser 6/19/23  S/p Cryo/Pneumo 7/6/23    Today 8/2/2023   VA HM (was CF), IOP 15, gas about 5%, detached, no new break noted     Plan: given repeat detachment, now mac off, needs surgery, aiming for 8/7/23 general anesthesia    Risks, benefits and alternatives to surgery and anesthesia including no treatment were discussed with the patient including: death, coma, blindness, bleeding, retinal detachment, cataract, need for more surgeries and glaucoma. The patient understands and accepts risks All questions were answered and the patient wishes to proceed with surgery    Recommend Pars Plana Vitrectomy / Endolaser / Possible Membrane Peel / Gas or Silicone Oil injection Right Eye   R/B/A to surgery and anesthesia discussed with patient including: death, coma, blindness, bleeding, retinal detachment, cataract, and glaucoma Patient understands and accepts risks All questions answered Patient wishes to proceed with surgery        RD precautions discussed in detail, patient expressed understanding  RTC immediately PRN (especially ANY change flashes, floaters, vision, visual field)       3. Ocular hypertension of left eye  Hx elevated IOP  Plan: Observation off gtt      RTC post-op      I saw and  examined the patient and reviewed in detail the findings documented. The final examination findings, image interpretations, and plan as documented in the record represent my personal judgment and conclusions.    Han Laguerre MD  Vitreoretinal Surgery   [facility]

## 2023-08-04 RX ORDER — MOXIFLOXACIN 5 MG/ML
1 SOLUTION/ DROPS OPHTHALMIC
Status: CANCELLED | OUTPATIENT
Start: 2023-08-04

## 2023-08-04 RX ORDER — PHENYLEPHRINE HYDROCHLORIDE 25 MG/ML
1 SOLUTION/ DROPS OPHTHALMIC
Status: CANCELLED | OUTPATIENT
Start: 2023-08-04

## 2023-08-04 RX ORDER — CYCLOPENTOLATE HYDROCHLORIDE 10 MG/ML
1 SOLUTION/ DROPS OPHTHALMIC
Status: CANCELLED | OUTPATIENT
Start: 2023-08-04

## 2023-08-04 RX ORDER — PREDNISOLONE ACETATE 10 MG/ML
1 SUSPENSION/ DROPS OPHTHALMIC
Status: CANCELLED | OUTPATIENT
Start: 2023-08-04

## 2023-08-04 RX ORDER — TETRACAINE HYDROCHLORIDE 5 MG/ML
1 SOLUTION OPHTHALMIC
Status: CANCELLED | OUTPATIENT
Start: 2023-08-04

## 2023-08-04 NOTE — PRE-PROCEDURE INSTRUCTIONS
PreOp Instructions given:   - Verbal medication information (what to hold and what to take)   - NPO guidelines   - Arrival place directions given; time to be given the day before procedure by the   Surgeon's Office [place]  - Bathing with antibacterial soap   - Don't wear any jewelry or bring any valuables AM of surgery   - No makeup or moisturizer to face   - No perfume/cologne, powder, lotions or aftershave   Pt. verbalized understanding.   Pt denies any h/o Anesthesia/Sedation complications or side effects.

## 2023-08-07 ENCOUNTER — ANESTHESIA EVENT (OUTPATIENT)
Dept: SURGERY | Facility: HOSPITAL | Age: 43
End: 2023-08-07
Payer: COMMERCIAL

## 2023-08-07 ENCOUNTER — ANESTHESIA (OUTPATIENT)
Dept: SURGERY | Facility: HOSPITAL | Age: 43
End: 2023-08-07
Payer: COMMERCIAL

## 2023-08-07 ENCOUNTER — HOSPITAL ENCOUNTER (OUTPATIENT)
Facility: HOSPITAL | Age: 43
Discharge: HOME | End: 2023-08-07
Attending: OPHTHALMOLOGY | Admitting: OPHTHALMOLOGY
Payer: COMMERCIAL

## 2023-08-07 VITALS
OXYGEN SATURATION: 97 % | SYSTOLIC BLOOD PRESSURE: 127 MMHG | BODY MASS INDEX: 35.92 KG/M2 | DIASTOLIC BLOOD PRESSURE: 75 MMHG | HEART RATE: 89 BPM | TEMPERATURE: 97 F | WEIGHT: 271 LBS | HEIGHT: 73 IN | RESPIRATION RATE: 19 BRPM

## 2023-08-07 DIAGNOSIS — H33.011 RETINAL DETACHMENT OF RIGHT EYE WITH SINGLE BREAK: ICD-10-CM

## 2023-08-07 PROCEDURE — 71000015 HC POSTOP RECOV 1ST HR: Performed by: OPHTHALMOLOGY

## 2023-08-07 PROCEDURE — 25000003 PHARM REV CODE 250: Performed by: OPHTHALMOLOGY

## 2023-08-07 PROCEDURE — 67108 REPAIR DETACHED RETINA: CPT | Mod: 79,RT,, | Performed by: OPHTHALMOLOGY

## 2023-08-07 PROCEDURE — D9220A PRA ANESTHESIA: ICD-10-PCS | Mod: ANES,,, | Performed by: ANESTHESIOLOGY

## 2023-08-07 PROCEDURE — 36000707: Performed by: OPHTHALMOLOGY

## 2023-08-07 PROCEDURE — D9220A PRA ANESTHESIA: Mod: ANES,,, | Performed by: ANESTHESIOLOGY

## 2023-08-07 PROCEDURE — 63600175 PHARM REV CODE 636 W HCPCS: Performed by: NURSE ANESTHETIST, CERTIFIED REGISTERED

## 2023-08-07 PROCEDURE — 36000706: Performed by: OPHTHALMOLOGY

## 2023-08-07 PROCEDURE — D9220A PRA ANESTHESIA: ICD-10-PCS | Mod: CRNA,,, | Performed by: NURSE ANESTHETIST, CERTIFIED REGISTERED

## 2023-08-07 PROCEDURE — 71000044 HC DOSC ROUTINE RECOVERY FIRST HOUR: Performed by: OPHTHALMOLOGY

## 2023-08-07 PROCEDURE — 67108 PR REPAIR DETACH RETINA,W VITRECTOMY: ICD-10-PCS | Mod: 79,RT,, | Performed by: OPHTHALMOLOGY

## 2023-08-07 PROCEDURE — 37000009 HC ANESTHESIA EA ADD 15 MINS: Performed by: OPHTHALMOLOGY

## 2023-08-07 PROCEDURE — D9220A PRA ANESTHESIA: Mod: CRNA,,, | Performed by: NURSE ANESTHETIST, CERTIFIED REGISTERED

## 2023-08-07 PROCEDURE — 27201423 OPTIME MED/SURG SUP & DEVICES STERILE SUPPLY: Performed by: OPHTHALMOLOGY

## 2023-08-07 PROCEDURE — 37000008 HC ANESTHESIA 1ST 15 MINUTES: Performed by: OPHTHALMOLOGY

## 2023-08-07 PROCEDURE — 25000003 PHARM REV CODE 250: Performed by: NURSE ANESTHETIST, CERTIFIED REGISTERED

## 2023-08-07 PROCEDURE — 63600175 PHARM REV CODE 636 W HCPCS: Performed by: OPHTHALMOLOGY

## 2023-08-07 RX ORDER — PHENYLEPHRINE HYDROCHLORIDE 25 MG/ML
1 SOLUTION/ DROPS OPHTHALMIC
Status: DISCONTINUED | OUTPATIENT
Start: 2023-08-07 | End: 2023-08-07 | Stop reason: HOSPADM

## 2023-08-07 RX ORDER — MOXIFLOXACIN 5 MG/ML
SOLUTION/ DROPS OPHTHALMIC
Status: DISCONTINUED | OUTPATIENT
Start: 2023-08-07 | End: 2023-08-07 | Stop reason: HOSPADM

## 2023-08-07 RX ORDER — ACETAZOLAMIDE 500 MG/5ML
INJECTION, POWDER, LYOPHILIZED, FOR SOLUTION INTRAVENOUS
Status: DISCONTINUED | OUTPATIENT
Start: 2023-08-07 | End: 2023-08-07

## 2023-08-07 RX ORDER — DEXAMETHASONE SODIUM PHOSPHATE 4 MG/ML
INJECTION, SOLUTION INTRA-ARTICULAR; INTRALESIONAL; INTRAMUSCULAR; INTRAVENOUS; SOFT TISSUE
Status: DISCONTINUED | OUTPATIENT
Start: 2023-08-07 | End: 2023-08-07

## 2023-08-07 RX ORDER — LIDOCAINE HYDROCHLORIDE 20 MG/ML
INJECTION INTRAVENOUS
Status: DISCONTINUED | OUTPATIENT
Start: 2023-08-07 | End: 2023-08-07

## 2023-08-07 RX ORDER — LIDOCAINE HYDROCHLORIDE 20 MG/ML
INJECTION, SOLUTION EPIDURAL; INFILTRATION; INTRACAUDAL; PERINEURAL
Status: DISCONTINUED
Start: 2023-08-07 | End: 2023-08-07 | Stop reason: HOSPADM

## 2023-08-07 RX ORDER — NEOMYCIN SULFATE, POLYMYXIN B SULFATE, AND DEXAMETHASONE 3.5; 10000; 1 MG/G; [USP'U]/G; MG/G
OINTMENT OPHTHALMIC
Status: DISCONTINUED | OUTPATIENT
Start: 2023-08-07 | End: 2023-08-07 | Stop reason: HOSPADM

## 2023-08-07 RX ORDER — MIDAZOLAM HYDROCHLORIDE 1 MG/ML
INJECTION, SOLUTION INTRAMUSCULAR; INTRAVENOUS
Status: DISCONTINUED | OUTPATIENT
Start: 2023-08-07 | End: 2023-08-07

## 2023-08-07 RX ORDER — HALOPERIDOL 5 MG/ML
INJECTION INTRAMUSCULAR
Status: DISCONTINUED | OUTPATIENT
Start: 2023-08-07 | End: 2023-08-07

## 2023-08-07 RX ORDER — TETRACAINE HYDROCHLORIDE 5 MG/ML
1 SOLUTION OPHTHALMIC
Status: DISCONTINUED | OUTPATIENT
Start: 2023-08-07 | End: 2023-08-07 | Stop reason: HOSPADM

## 2023-08-07 RX ORDER — DEXAMETHASONE SODIUM PHOSPHATE 4 MG/ML
INJECTION, SOLUTION INTRA-ARTICULAR; INTRALESIONAL; INTRAMUSCULAR; INTRAVENOUS; SOFT TISSUE
Status: DISCONTINUED
Start: 2023-08-07 | End: 2023-08-07 | Stop reason: HOSPADM

## 2023-08-07 RX ORDER — MOXIFLOXACIN 5 MG/ML
1 SOLUTION/ DROPS OPHTHALMIC
Status: DISCONTINUED | OUTPATIENT
Start: 2023-08-07 | End: 2023-08-07 | Stop reason: HOSPADM

## 2023-08-07 RX ORDER — ONDANSETRON 2 MG/ML
INJECTION INTRAMUSCULAR; INTRAVENOUS
Status: DISCONTINUED | OUTPATIENT
Start: 2023-08-07 | End: 2023-08-07

## 2023-08-07 RX ORDER — DEXAMETHASONE SODIUM PHOSPHATE 4 MG/ML
INJECTION, SOLUTION INTRA-ARTICULAR; INTRALESIONAL; INTRAMUSCULAR; INTRAVENOUS; SOFT TISSUE
Status: DISCONTINUED | OUTPATIENT
Start: 2023-08-07 | End: 2023-08-07 | Stop reason: HOSPADM

## 2023-08-07 RX ORDER — DEXMEDETOMIDINE HYDROCHLORIDE 100 UG/ML
INJECTION, SOLUTION INTRAVENOUS
Status: DISCONTINUED | OUTPATIENT
Start: 2023-08-07 | End: 2023-08-07

## 2023-08-07 RX ORDER — ACETAMINOPHEN 10 MG/ML
INJECTION, SOLUTION INTRAVENOUS
Status: DISCONTINUED | OUTPATIENT
Start: 2023-08-07 | End: 2023-08-07

## 2023-08-07 RX ORDER — NEOMYCIN SULFATE, POLYMYXIN B SULFATE, AND DEXAMETHASONE 3.5; 10000; 1 MG/G; [USP'U]/G; MG/G
OINTMENT OPHTHALMIC
Status: DISCONTINUED
Start: 2023-08-07 | End: 2023-08-07 | Stop reason: HOSPADM

## 2023-08-07 RX ORDER — PROPOFOL 10 MG/ML
VIAL (ML) INTRAVENOUS
Status: DISCONTINUED | OUTPATIENT
Start: 2023-08-07 | End: 2023-08-07

## 2023-08-07 RX ORDER — VANCOMYCIN HYDROCHLORIDE 500 MG/10ML
INJECTION, POWDER, LYOPHILIZED, FOR SOLUTION INTRAVENOUS
Status: DISCONTINUED
Start: 2023-08-07 | End: 2023-08-07 | Stop reason: HOSPADM

## 2023-08-07 RX ORDER — PREDNISOLONE ACETATE 10 MG/ML
1 SUSPENSION/ DROPS OPHTHALMIC
Status: DISCONTINUED | OUTPATIENT
Start: 2023-08-07 | End: 2023-08-07 | Stop reason: HOSPADM

## 2023-08-07 RX ORDER — ACETAZOLAMIDE 500 MG/5ML
INJECTION, POWDER, LYOPHILIZED, FOR SOLUTION INTRAVENOUS
Status: DISCONTINUED
Start: 2023-08-07 | End: 2023-08-07 | Stop reason: HOSPADM

## 2023-08-07 RX ORDER — ROCURONIUM BROMIDE 10 MG/ML
INJECTION, SOLUTION INTRAVENOUS
Status: DISCONTINUED | OUTPATIENT
Start: 2023-08-07 | End: 2023-08-07

## 2023-08-07 RX ORDER — FENTANYL CITRATE 50 UG/ML
INJECTION, SOLUTION INTRAMUSCULAR; INTRAVENOUS
Status: DISCONTINUED | OUTPATIENT
Start: 2023-08-07 | End: 2023-08-07

## 2023-08-07 RX ORDER — CYCLOPENTOLATE HYDROCHLORIDE 10 MG/ML
1 SOLUTION/ DROPS OPHTHALMIC
Status: DISCONTINUED | OUTPATIENT
Start: 2023-08-07 | End: 2023-08-07 | Stop reason: HOSPADM

## 2023-08-07 RX ADMIN — PREDNISOLONE ACETATE 1 DROP: 10 SUSPENSION/ DROPS OPHTHALMIC at 09:08

## 2023-08-07 RX ADMIN — CYCLOPENTOLATE HYDROCHLORIDE 1 DROP: 10 SOLUTION/ DROPS OPHTHALMIC at 09:08

## 2023-08-07 RX ADMIN — SUGAMMADEX 300 MG: 100 INJECTION, SOLUTION INTRAVENOUS at 12:08

## 2023-08-07 RX ADMIN — PROPOFOL 200 MG: 10 INJECTION, EMULSION INTRAVENOUS at 10:08

## 2023-08-07 RX ADMIN — ACETAMINOPHEN 1000 MG: 10 INJECTION, SOLUTION INTRAVENOUS at 10:08

## 2023-08-07 RX ADMIN — SODIUM CHLORIDE: 0.9 INJECTION, SOLUTION INTRAVENOUS at 10:08

## 2023-08-07 RX ADMIN — MOXIFLOXACIN OPHTHALMIC 1 DROP: 5 SOLUTION/ DROPS OPHTHALMIC at 09:08

## 2023-08-07 RX ADMIN — PHENYLEPHRINE HYDROCHLORIDE 1 DROP: 25 SOLUTION/ DROPS OPHTHALMIC at 09:08

## 2023-08-07 RX ADMIN — ONDANSETRON 4 MG: 2 INJECTION INTRAMUSCULAR; INTRAVENOUS at 10:08

## 2023-08-07 RX ADMIN — ACETAZOLAMIDE 500 MG: 500 INJECTION, POWDER, LYOPHILIZED, FOR SOLUTION INTRAVENOUS at 11:08

## 2023-08-07 RX ADMIN — DEXMEDETOMIDINE 8 MCG: 100 INJECTION, SOLUTION, CONCENTRATE INTRAVENOUS at 11:08

## 2023-08-07 RX ADMIN — HALOPERIDOL LACTATE 1 MG: 5 INJECTION, SOLUTION INTRAMUSCULAR at 10:08

## 2023-08-07 RX ADMIN — ROCURONIUM BROMIDE 50 MG: 10 INJECTION INTRAVENOUS at 10:08

## 2023-08-07 RX ADMIN — MIDAZOLAM HYDROCHLORIDE 2 MG: 1 INJECTION, SOLUTION INTRAMUSCULAR; INTRAVENOUS at 10:08

## 2023-08-07 RX ADMIN — LIDOCAINE HYDROCHLORIDE 100 MG: 20 INJECTION INTRAVENOUS at 10:08

## 2023-08-07 RX ADMIN — TETRACAINE HYDROCHLORIDE 1 DROP: 5 SOLUTION OPHTHALMIC at 09:08

## 2023-08-07 RX ADMIN — DEXAMETHASONE SODIUM PHOSPHATE 4 MG: 4 INJECTION, SOLUTION INTRAMUSCULAR; INTRAVENOUS at 10:08

## 2023-08-07 RX ADMIN — FENTANYL CITRATE 100 MCG: 50 INJECTION, SOLUTION INTRAMUSCULAR; INTRAVENOUS at 10:08

## 2023-08-07 NOTE — PLAN OF CARE
Pt a/o x4.VSS. no c/o pain or nausea. Eye patch and shield in place c/d/I. Pt able to maintain proper positioning prone with face down. D/c instructions given to spouse and pt. Both verbally agreed to understanding. Pt meets criteria for discharge and discharged in stable condition.

## 2023-08-07 NOTE — PATIENT INSTRUCTIONS
Post-Operative Instructions:    - Keep eye shield & dressing in place until we remove it tomorrow in eye clinic.  - It is all right to watch television or to read.   - Tylenol as needed for general discomfort.    - You cannot fly until the gas bubble in your eye disperses .  - Maintain head in a FACE DOWN OR LEFT SIDE DOWN position  - Keep your face out of water for five days after surgery - NO SHOWERS.  - No excessive exercise.    - No bending, lifting or straining.   - Call MD if significant pain or nausea / vomiting uncontrolled by medications  - Call MD if temperature in excess of 101' F  - Return to eye clinic for post op examination tomorrow morning.  - Bring medicine bag to tomorrow's appointment.    FOR EMERGENCIES:  If any unusual problems or difficulties occur, contact Dr. Vickers or the eye resident on call at the hospital main line

## 2023-08-07 NOTE — BRIEF OP NOTE
Brief Operative Note  Ophthalmology     Pre-Op Dx: retina detachment right     Post Op Dx: same     Procedure Performed: vitrectomy, laser, gas right     Attending Surgeon: Han Laguerre MD     Assistant Surgeon: Chris Newberry MD    Anesthesia: General    Estimated blood loss: Minimal    Complications: None    Specimen: None    Disposition: Stable to recovery    Findings/Outcome: retina detached, repaired     Date of Discharge: today 8/7/2023     Discharge Disposition: home     F/U: tomorrow AM with Dr Laguerre

## 2023-08-07 NOTE — ANESTHESIA PREPROCEDURE EVALUATION
08/07/2023  Yung Hamilton is a 43 y.o., male.  Pre-operative evaluation for Procedure(s) (LRB):  REPAIR, RETINAL DETACHMENT, WITH VITRECTOMY (Right)    Yung Hamilton is a 43 y.o. 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     Current Outpatient Medications on File Prior to Encounter   Medication Sig Dispense Refill    olmesartan (BENICAR) 20 MG tablet Take 1 tablet (20 mg total) by mouth once daily. 90 tablet 3    traZODone (DESYREL) 100 MG tablet Take 1 tablet (100 mg total) by mouth every evening. (Patient taking differently: Take 100 mg by mouth nightly as needed.) 30 tablet 11    acetaminophen (TYLENOL EXTRA STRENGTH) 500 MG tablet Take 2 tablets (1,000 mg total) by mouth every 6 (six) hours as needed for Pain. 100 tablet 2    ALPRAZolam (XANAX) 0.5 MG tablet Take 1 tablet (0.5 mg total) by mouth daily as needed for Anxiety. 30 tablet 0    amoxicillin (AMOXIL) 875 MG tablet Take 875 mg by mouth every 12 (twelve) hours.      dorzolamide-timolol 2-0.5% (COSOPT) 22.3-6.8 mg/mL ophthalmic solution Place 1 drop into the right eye 2 (two) times daily. (Patient not taking: Reported on 7/12/2023) 10 mL 1    prednisoLONE acetate (PRED FORTE) 1 % DrpS Place 1 drop into the right eye every 4 (four) hours. (Patient not taking: Reported on 7/12/2023) 5 mL 0       Past Surgical History:   Procedure Laterality Date    CATARACT EXTRACTION      aphakia right eye    EYE SURGERY  2013    TONSILLECTOMY, ADENOIDECTOMY   "y-28       Social History     Socioeconomic History    Marital status:      Spouse name: Maria L    Number of children: 3   Tobacco Use    Smoking status: Former     Current packs/day: 1.00     Average packs/day: 1 pack/day for 4.0 years (4.0 ttl pk-yrs)     Types: Cigarettes    Smokeless tobacco: Never   Substance and Sexual Activity    Alcohol use: Yes     Alcohol/week: 1.0 standard drink of alcohol     Types: 1 Drinks containing 0.5 oz of alcohol per week     Comment: occasionaly    Drug use: No    Sexual activity: Yes     Partners: Female     Birth control/protection: None   Social History Narrative    Job : Enter[employer] :      Diet : Normal     Exercise : "trying, but I work shift work"          Pre-op Assessment    I have reviewed the Patient Summary Reports.     I have reviewed the Nursing Notes.    I have reviewed the Medications.     Review of Systems  Anesthesia Hx:  No problems with previous Anesthesia  History of prior surgery of interest to airway management or planning: Denies Family Hx of Anesthesia complications.   Denies Personal Hx of Anesthesia complications.   Social:  Non-Smoker    Hematology/Oncology:  Hematology Normal   Oncology Normal     EENT/Dental:EENT/Dental Normal   Cardiovascular:  Cardiovascular Normal     Pulmonary:  Pulmonary Normal    Renal/:  Renal/ Normal     Hepatic/GI:  Hepatic/GI Normal    Musculoskeletal:  Musculoskeletal Normal    Neurological:  Neurology Normal    Endocrine:  Endocrine Normal  Obesity / BMI > 30  Psych:  Psychiatric Normal           Physical Exam  General: Well nourished and Cooperative    Airway:  Mallampati: II   Mouth Opening: Normal  TM Distance: Normal  Tongue: Normal  Neck ROM: Normal ROM    Dental:  Intact    Chest/Lungs:  Clear to auscultation, Normal Respiratory Rate    Heart:  Rate: Normal  Rhythm: Regular Rhythm  Sounds: Normal        Anesthesia Plan  Type of Anesthesia, risks & benefits discussed:    Anesthesia " Type: Gen ETT, Gen Supraglottic Airway, Gen Natural Airway  Intra-op Monitoring Plan: Standard ASA Monitors  Post Op Pain Control Plan: multimodal analgesia  Induction:  IV  Airway Plan: , Post-Induction  Informed Consent: Informed consent signed with the Patient and all parties understand the risks and agree with anesthesia plan.  All questions answered.   ASA Score: 2  Day of Surgery Review of History & Physical: H&P Update referred to the surgeon/provider.    Ready For Surgery From Anesthesia Perspective.     .

## 2023-08-07 NOTE — OP NOTE
Anesthesia Post Evaluation    Patient: Paulino George    Procedure(s) Performed: Procedure(s) (LRB):  MYRINGOTOMY WITH INSERTION OF PE TUBES (Bilateral)    Final Anesthesia Type: general  Patient location during evaluation: PACU  Patient participation: Yes- Able to Participate  Level of consciousness: awake and alert  Post-procedure vital signs: reviewed and stable  Pain management: adequate  Airway patency: patent  PONV status at discharge: No PONV  Anesthetic complications: no      Cardiovascular status: stable  Respiratory status: unassisted and spontaneous ventilation  Hydration status: euvolemic  Follow-up not needed.        Visit Vitals    BP 98/55    Pulse (!) 132    Temp 36.5 °C (97.7 °F) (Temporal)    Resp 26    Wt 7.37 kg (16 lb 4 oz)    SpO2 100%       Pain/Antony Score: Pain Assessment Performed: Yes (3/10/2017  8:21 AM)  Presence of Pain: non-verbal indicators absent (3/10/2017  8:21 AM)  Pain Rating Prior to Med Admin: 3 (3/10/2017  7:59 AM)  Antony Score: 10 (3/10/2017  8:21 AM)       PATIENT NAME: Yung Hamilton      MEDICAL RECORD NUMBER: [id number]     Date of Surgery 8/7/2023    ATTENDING SURGEON: Han Laguerre MD (A)    ASSISTANT SURGEON: Kathy Newberry MD (F), Flynn Gomez MD (R)    PREOPERATIVE DIAGNOSIS:  Rhegmatogenous retinal detachment of the Right eye.    OPERATION:  Pars plana vitrectomy, endolaser, fluid air exchange, 15% C3F8 gas injection of the Right eye.    POSTOPERATIVE DIAGNOSIS:  Same    ANESTHESIA:  General anesthesia    COMPLICATIONS:  None.    SPECIMENS:  None.    EBL:  Minimal      Indications for Surgery  Yung Hamilton  is a 43 y.o. year old presenting after pneumatic retinopexy/cryopexy for retinal detachment with decreased vision and findings of recurrent retinal detachment in the Right eye. The recommendation was for surgical repair. After the risks, benefits and alternatives were discussed with Yung Hamilton the patient consented to the procedure and was scheduled for surgery.     Description of Procedure:    The patient, Yung Hamilton gave informed consent for the following operation which was  performed under General anesthesia. The patient was examined with an indirect ophthalmoscope and we decided to proceed with surgery. A time out was performed and confirmed by all in the room that the Right eye was the correct operative eye. Following an induction of general anesthesia the eye was prepped and draped in the usual sterile fashion for vitrectomy surgery.     The cornea was kept moist with OVD placed on the surface throughout the operation. A 25 gauge vitrectomy system was used. Initial entry into the eye was gained with 3 trocars placed 3 mm from the limbus. In the inferotemporal location, the infusion cannula was secured in position. The open end of the cannula was visualized through the dilated pupil and the infusion was turned on when it was in the correct position. The 2 superior sclerotomies were used for entry of a light pipe and a vitrectomy  cutter. Upon entry into the eye, the retina was noted to be detached with macula involvement. Significant vitreous was noted and core vitrectomy was performed. Dilute triamcinolone was injected to confirm a PVD. Next, peripheral vitrectomy was performed under scleral depression.     We inspected the retinal periphery with scleral depression. Previously cryopexy was noted and with elevated break at 11clock was noted and marked with diathermy. A posterior superotemporal retinotomy was created with diathermy and soft tip cannula. A fluid-air exchange was then performed flattening the retina draining from the retinotomy. Using the endolaser retinopexy was applied to the retinal breaks and in a 360 barricade fashion. Additional fluid-air exchange was performed.     The superonasal trocar was removed and sutured watertight.    A sterile preparation of 15% C3F8 gas was brought to the field and connected to the infusion line. The gas was then rinsed through the eye several times, venting the excess gas through the superotemporal port. The eye was then inflated to physiologic pressure and the remaining ports were removed and sutured as appropriate.    The intraocular pressure remained normal. Betadine was place on the eye. Vancomycin and Decadron were injected into the sub-Tenon space. The eye was irrigated with BSS. A drop of atropine and Maxitrol ointment were placed on the eye, which was then patched and shielded. There were no complications.    The patient returned to the PACU in stable condition and will follow up tomorrow in the eye clinic.    Due to the complexity of this case, there was no qualified resident able to assist.

## 2023-08-07 NOTE — ANESTHESIA POSTPROCEDURE EVALUATION
Anesthesia Post Evaluation    Patient: Yung Hamilton    Procedure(s) Performed: Procedure(s) (LRB):  REPAIR RETINAL DETACHMENT WITH 25G VITRECTOMY AND GAS EXCHANGE: C3F8 (Right)    Final Anesthesia Type: general      Patient location during evaluation: PACU  Patient participation: Yes- Able to Participate  Level of consciousness: awake and alert  Post-procedure vital signs: reviewed and stable  Pain management: adequate  Airway patency: patent    PONV status at discharge: No PONV  Anesthetic complications: no      Cardiovascular status: blood pressure returned to baseline and hemodynamically stable  Respiratory status: unassisted and spontaneous ventilation  Hydration status: euvolemic  Follow-up not needed.          Vitals Value Taken Time   /75 08/07/23 1302   Temp 36.3 °C (97.3 °F) 08/07/23 1219   Pulse 89 08/07/23 1312   Resp 19 08/07/23 1312   SpO2 97 % 08/07/23 1312         No case tracking events are documented in the log.      Pain/Rosalind Score: Rosalind Score: 10 (8/7/2023 12:45 PM)

## 2023-08-07 NOTE — TRANSFER OF CARE
"Anesthesia Transfer of Care Note    Patient: Yung Hamilton    Procedure(s) Performed: Procedure(s) (LRB):  REPAIR RETINAL DETACHMENT WITH 25G VITRECTOMY AND GAS EXCHANGE: C3F8 (Right)    Patient location: PACU    Anesthesia Type: general    Transport from OR: Transported from OR on 6-10 L/min O2 by face mask with adequate spontaneous ventilation    Post pain: adequate analgesia    Post assessment: no apparent anesthetic complications and tolerated procedure well    Post vital signs: stable    Level of consciousness: responds to stimulation and sedated    Nausea/Vomiting: no nausea/vomiting    Complications: none    Transfer of care protocol was followed      Last vitals:   Visit Vitals  /75 (BP Location: Left arm, Patient Position: Lying)   Pulse 89   Temp 36.6 °C (97.9 °F) (Temporal)   Resp 18   Ht 6' 1" (1.854 m)   Wt 122.9 kg (271 lb)   SpO2 98%   BMI 35.75 kg/m²     "

## 2023-08-07 NOTE — ANESTHESIA PROCEDURE NOTES
Intubation    Date/Time: 8/7/2023 10:43 AM    Performed by: Karyn Garcia CRNA  Authorized by: Mary Kay Verma MD    Intubation:     Induction:  Intravenous    Intubated:  Postinduction    Mask Ventilation:  Easy mask    Attempts:  1    Attempted By:  CRNA    Method of Intubation:  Video laryngoscopy    Blade:  Sotelo 4    Laryngeal View Grade: Grade I - full view of cords      Difficult Airway Encountered?: No      Complications:  None    Airway Device:  Oral endotracheal tube    Airway Device Size:  8.0    Style/Cuff Inflation:  Cuffed (inflated to minimal occlusive pressure)    Tube secured:  23    Secured at:  The lips    Placement Verified By:  Capnometry    Complicating Factors:  None    Findings Post-Intubation:  BS equal bilateral and atraumatic/condition of teeth unchanged

## 2023-08-07 NOTE — H&P
Pre-Operative History & Physical  Ophthalmology      SUBJECTIVE:     History of Present Illness:  Patient is a 43 y.o. male presents with Retinal detachment of right eye with single break [H33.011].    MEDICATIONS:   Facility-Administered Medications Prior to Admission   Medication    acetaminophen tablet 500 mg     PTA Medications   Medication Sig    olmesartan (BENICAR) 20 MG tablet Take 1 tablet (20 mg total) by mouth once daily.    traZODone (DESYREL) 100 MG tablet Take 1 tablet (100 mg total) by mouth every evening. (Patient taking differently: Take 100 mg by mouth nightly as needed.)    acetaminophen (TYLENOL EXTRA STRENGTH) 500 MG tablet Take 2 tablets (1,000 mg total) by mouth every 6 (six) hours as needed for Pain.    ALPRAZolam (XANAX) 0.5 MG tablet Take 1 tablet (0.5 mg total) by mouth daily as needed for Anxiety.    amoxicillin (AMOXIL) 875 MG tablet Take 875 mg by mouth every 12 (twelve) hours.    dorzolamide-timolol 2-0.5% (COSOPT) 22.3-6.8 mg/mL ophthalmic solution Place 1 drop into the right eye 2 (two) times daily. (Patient not taking: Reported on 7/12/2023)    prednisoLONE acetate (PRED FORTE) 1 % DrpS Place 1 drop into the right eye every 4 (four) hours. (Patient not taking: Reported on 7/12/2023)       ALLERGIES: Review of patient's allergies indicates:  No Known Allergies    PAST MEDICAL HISTORY:   Past Medical History:   Diagnosis Date    Aphakia of right eye     Corneal perforation 9/8/13    right eye    Episcleritis of left eye 9/25/14    Pupil irregularity     Ruptured globe of right eye 9/8/13    Traumatic cataract of right eye      PAST SURGICAL HISTORY:   Past Surgical History:   Procedure Laterality Date    CATARACT EXTRACTION      aphakia right eye    EYE SURGERY  2013    TONSILLECTOMY, ADENOIDECTOMY  y-28     PAST FAMILY HISTORY:   Family History   Problem Relation Age of Onset    Cancer Mother         sarcoma    Cancer Maternal Grandmother     Cancer Paternal Grandmother     Diabetes  Paternal Grandmother     Diabetes Paternal Grandfather      SOCIAL HISTORY:   Social History     Tobacco Use    Smoking status: Former     Current packs/day: 1.00     Average packs/day: 1 pack/day for 4.0 years (4.0 ttl pk-yrs)     Types: Cigarettes    Smokeless tobacco: Never   Substance Use Topics    Alcohol use: Yes     Alcohol/week: 1.0 standard drink of alcohol     Types: 1 Drinks containing 0.5 oz of alcohol per week     Comment: occasionaly    Drug use: No        MENTAL STATUS: Alert    REVIEW OF SYSTEMS: Negative    OBJECTIVE:     Vital Signs (Most Recent)  Temp: 97.9 °F (36.6 °C) (08/07/23 0941)  Pulse: 89 (08/07/23 0941)  Resp: 18 (08/07/23 0941)  BP: 131/75 (08/07/23 0941)  SpO2: 98 % (08/07/23 0941)    Physical Exam:  General: NAD  HEENT: AT/NC, right eye retinal detachment  Lungs: Adequate respirations  Heart: + pulses  Abdomen: Soft    ASSESSMENT/PLAN:     Patient is a 43 y.o. male with right eye retinal detachment with single  break. Aphakia OD, and ocular HTN OS.      - Risks/benefits/alternatives of the procedure including, but not limited to, infection, bleeding, pain, ptosis, macular edema, corneal edema, persistent corneal defect, retinal tears, retinal detachment, epiretinal membrane, elevated intraocular pressure, hypotony, possible need for strict post-op head positioning, possible temporary avoidance of air travel, loss of vision, loss of the eye, paralysis, and death were discussed with the patient and/or family. The patient/family voiced good understanding, the informed consent was signed, witnessed, and placed in chart. All patient and family questions were answered.   - Will proceed with PPV/ EL/ Possible membrane peel / gas or silicone oil injection, right eye.  - Plan for general anesthesia   - Allergies reviewed: Review of patient's allergies indicates:  No Known Allergies  - patient is not taking blood thinners,    Flynn Gomez MD  [facility]

## 2023-08-08 ENCOUNTER — OFFICE VISIT (OUTPATIENT)
Dept: OPHTHALMOLOGY | Facility: CLINIC | Age: 43
End: 2023-08-08
Payer: COMMERCIAL

## 2023-08-08 ENCOUNTER — TELEPHONE (OUTPATIENT)
Dept: OPTOMETRY | Facility: CLINIC | Age: 43
End: 2023-08-08
Payer: COMMERCIAL

## 2023-08-08 DIAGNOSIS — H33.011 RETINAL DETACHMENT OF RIGHT EYE WITH SINGLE BREAK: Primary | ICD-10-CM

## 2023-08-08 PROCEDURE — 99999 PR PBB SHADOW E&M-EST. PATIENT-LVL II: ICD-10-PCS | Mod: PBBFAC,,, | Performed by: OPHTHALMOLOGY

## 2023-08-08 PROCEDURE — 99024 POSTOP FOLLOW-UP VISIT: CPT | Mod: S$GLB,,, | Performed by: OPHTHALMOLOGY

## 2023-08-08 PROCEDURE — 99024 PR POST-OP FOLLOW-UP VISIT: ICD-10-PCS | Mod: S$GLB,,, | Performed by: OPHTHALMOLOGY

## 2023-08-08 PROCEDURE — 99999 PR PBB SHADOW E&M-EST. PATIENT-LVL II: CPT | Mod: PBBFAC,,, | Performed by: OPHTHALMOLOGY

## 2023-08-08 RX ORDER — DORZOLAMIDE HYDROCHLORIDE AND TIMOLOL MALEATE 20; 5 MG/ML; MG/ML
1 SOLUTION/ DROPS OPHTHALMIC 2 TIMES DAILY
Qty: 10 ML | Refills: 1 | Status: SHIPPED | OUTPATIENT
Start: 2023-08-08 | End: 2024-08-07

## 2023-08-08 NOTE — PROGRESS NOTES
HPI    POD#1 RRd OD w/ Single Break    DLS 08/02/2023 by Dr. JASON Laugerre MD    CC: pt reports no eye pain just a little uncomfortable.     ++blurred Va due to Sx  --headaches  --diplopia  --flashes/floaters  -curtain/shadows/veils    Eye Meds: PF OD qid                    Vigamox OD qid                    ATropine OD qhs                    Ointment OD qhs    POHx:     POHX:     1. APHAKIA OD     2. RD OD   S/P PNEUMO/CRYO  06/16/2023   S/P RRD OD w/ Single Break  ( 08/01/2023)    3. HM  FOCAL BREAK LAST VISIT      Last edited by Dominique Barker MA on 8/8/2023 11:25 AM.          A/P    ICD-10-CM ICD-9-CM   1. Retinal detachment of right eye with single break  H33.011 361.01       1. Retinal detachment of right eye with single break  2. Aphakia of right eye  Hx of globe injury (glass injury?), s/p PPV/PPL/open globe revision OD (2013)    Initial Visit: Presents with 1-2 days of blurred vision and finding of mac off RD  VA HM, bullous detachment with focal break at 10oclock with      S/p Cryo/Pneumo 6/16/23  S/p supplemental laser 6/19/23  S/p Cryo/Pneumo 7/6/23    Pre-Op - VA HM (was CF), IOP 15, gas about 5%, detached, no new break noted     Postop: s/p PPV/EL/Afx/15% C3F8 (Date 87/23 by Carlotta/Rohith/Jason) for Mac off RD  Positioning: Face down  Duration: 7 days    8/8/2023 VA HM as expected, IOP 26, retina flat, good 360 laser and at break/retinotomy    Instructions reviewed   - RD precautions  - Return for increasing pain/decreasing vision  - No getting the eye wet, no rubbing the eye, no heavy lifting for 1 month after surgery  Drops:  Vigamox  - 4   Pred - 4(start 8/8/2023)  Cyc -4  Maxitrol - QHS   Start cosopt 2      3. Ocular hypertension of left eye  Hx elevated IOP  Plan: Observation off gtt      RTC POW1 DFE OD ([place])    I saw and examined the patient and reviewed in detail the findings documented. The final examination findings, image interpretations, and plan as documented in the record represent  my personal judgment and conclusions.    Han Laguerre MD  Vitreoretinal Surgery   [facility]

## 2023-08-08 NOTE — TELEPHONE ENCOUNTER
----- Message from Anaid Mcnamara sent at 8/8/2023 12:44 PM CDT -----  Regarding: appt  This pt needs 1 week DFE OD with Dr. Laguerre in [place].

## 2023-08-10 ENCOUNTER — OFFICE VISIT (OUTPATIENT)
Dept: FAMILY MEDICINE | Facility: CLINIC | Age: 43
End: 2023-08-10
Payer: COMMERCIAL

## 2023-08-10 ENCOUNTER — LAB VISIT (OUTPATIENT)
Dept: LAB | Facility: HOSPITAL | Age: 43
End: 2023-08-10
Attending: FAMILY MEDICINE
Payer: COMMERCIAL

## 2023-08-10 VITALS
HEART RATE: 60 BPM | WEIGHT: 272.63 LBS | OXYGEN SATURATION: 99 % | BODY MASS INDEX: 36.13 KG/M2 | SYSTOLIC BLOOD PRESSURE: 124 MMHG | DIASTOLIC BLOOD PRESSURE: 76 MMHG | HEIGHT: 73 IN

## 2023-08-10 DIAGNOSIS — R63.5 WEIGHT GAIN: ICD-10-CM

## 2023-08-10 DIAGNOSIS — Z00.00 WELLNESS EXAMINATION: ICD-10-CM

## 2023-08-10 DIAGNOSIS — Z00.00 WELLNESS EXAMINATION: Primary | ICD-10-CM

## 2023-08-10 PROCEDURE — 84443 ASSAY THYROID STIM HORMONE: CPT | Performed by: FAMILY MEDICINE

## 2023-08-10 PROCEDURE — 82533 TOTAL CORTISOL: CPT | Performed by: FAMILY MEDICINE

## 2023-08-10 PROCEDURE — 99396 PR PREVENTIVE VISIT,EST,40-64: ICD-10-PCS | Mod: S$GLB,,, | Performed by: FAMILY MEDICINE

## 2023-08-10 PROCEDURE — 36415 COLL VENOUS BLD VENIPUNCTURE: CPT | Mod: PN | Performed by: FAMILY MEDICINE

## 2023-08-10 PROCEDURE — 99396 PREV VISIT EST AGE 40-64: CPT | Mod: S$GLB,,, | Performed by: FAMILY MEDICINE

## 2023-08-10 PROCEDURE — 80061 LIPID PANEL: CPT | Performed by: FAMILY MEDICINE

## 2023-08-10 PROCEDURE — 99999 PR PBB SHADOW E&M-EST. PATIENT-LVL III: ICD-10-PCS | Mod: PBBFAC,,, | Performed by: FAMILY MEDICINE

## 2023-08-10 PROCEDURE — 99999 PR PBB SHADOW E&M-EST. PATIENT-LVL III: CPT | Mod: PBBFAC,,, | Performed by: FAMILY MEDICINE

## 2023-08-10 PROCEDURE — 84439 ASSAY OF FREE THYROXINE: CPT | Performed by: FAMILY MEDICINE

## 2023-08-10 PROCEDURE — 83036 HEMOGLOBIN GLYCOSYLATED A1C: CPT | Performed by: FAMILY MEDICINE

## 2023-08-10 PROCEDURE — 85025 COMPLETE CBC W/AUTO DIFF WBC: CPT | Performed by: FAMILY MEDICINE

## 2023-08-10 PROCEDURE — 80053 COMPREHEN METABOLIC PANEL: CPT | Performed by: FAMILY MEDICINE

## 2023-08-10 NOTE — PROGRESS NOTES
THIS DOCUMENT WAS MADE IN PART WITH VOICE RECOGNITION SOFTWARE.  OCCASIONALLY THIS SOFTWARE WILL MISINTERPRET WORDS OR PHRASES.    Assessment and Plan:    1. Wellness examination  CBC Auto Differential    Comprehensive Metabolic Panel    Lipid Panel    Hemoglobin A1C    TSH    T4, Free    Urinalysis Microscopic    Urinalysis, Reflex to Urine Culture Urine, Clean Catch    Cortisol      2. Weight gain  TSH    T4, Free    Cortisol        Check labs for weight gain  Wellness labs as above   Other chronic conditions appear to be well controlled        ______________________________________________________________________  Subjective:    Chief Complaint:  Chief Complaint   Patient presents with    Follow-up     6 month f/u         HPI:  Yung is a 43 y.o. year old     Interval history   At previous visit we did start Benicar 20 mg.  Reports medicine working well at home, denies any negative side effects   Blood pressure at goal today  Denies any exertional chest symptoms     Recently had retinal tear.  Has had surgery and is currently wearing an eye patch during healing phase.    Patient having trouble losing weight.    Eye pathology  History of traumatic cataract, ruptured globe, episcleritis, corneal perforation, aphakia     Tobacco Dep.   Quit 18 - 20 years ago   4 py total    Insomnia  Rx : Trazodone 100 mg PRN (works well)     Ess Hypertension  Rx : Benicar 20  Normotensive at home               Past Medical History:  Past Medical History:   Diagnosis Date    Aphakia of right eye     Corneal perforation 9/8/13    right eye    Episcleritis of left eye 9/25/14    Pupil irregularity     Ruptured globe of right eye 9/8/13    Traumatic cataract of right eye        Past Surgical History:  Past Surgical History:   Procedure Laterality Date    CATARACT EXTRACTION      aphakia right eye    EYE SURGERY  2013    REPAIR OF RETINAL DETACHMENT WITH VITRECTOMY Right 8/7/2023    Procedure: REPAIR RETINAL DETACHMENT WITH 25G  "VITRECTOMY AND GAS EXCHANGE: C3F8;  Surgeon: Han Laguerre MD;  Location: [facility] OR [address] [place] [address];  Service: Ophthalmology;  Laterality: Right;    TONSILLECTOMY, ADENOIDECTOMY  y-28       Family History:  Family History   Problem Relation Age of Onset    Cancer Mother         sarcoma    Cancer Maternal Grandmother     Cancer Paternal Grandmother     Diabetes Paternal Grandmother     Diabetes Paternal Grandfather        Social History:  Social History     Socioeconomic History    Marital status:      Spouse name: Maria L    Number of children: 3   Tobacco Use    Smoking status: Former     Current packs/day: 1.00     Average packs/day: 1 pack/day for 4.0 years (4.0 ttl pk-yrs)     Types: Cigarettes    Smokeless tobacco: Never   Substance and Sexual Activity    Alcohol use: Yes     Alcohol/week: 1.0 standard drink of alcohol     Types: 1 Drinks containing 0.5 oz of alcohol per week     Comment: occasionaly    Drug use: No    Sexual activity: Yes     Partners: Female     Birth control/protection: None   Social History Narrative    Job : Enter[employer] :      Diet : Normal     Exercise : "trying, but I work shift work"        Medications:  Current Outpatient Medications on File Prior to Visit   Medication Sig Dispense Refill    ALPRAZolam (XANAX) 0.5 MG tablet Take 1 tablet (0.5 mg total) by mouth daily as needed for Anxiety. 30 tablet 0    dorzolamide-timolol 2-0.5% (COSOPT) 22.3-6.8 mg/mL ophthalmic solution Place 1 drop into the right eye 2 (two) times daily. 10 mL 1    olmesartan (BENICAR) 20 MG tablet Take 1 tablet (20 mg total) by mouth once daily. (Patient taking differently: Take 20 mg by mouth once daily.) 90 tablet 3    traZODone (DESYREL) 100 MG tablet Take 1 tablet (100 mg total) by mouth every evening. (Patient taking differently: Take 100 mg by mouth nightly as needed.) 30 tablet 11    amoxicillin (AMOXIL) 875 MG tablet Take 875 mg by mouth every 12 (twelve) hours.      dorzolamide-timolol " "2-0.5% (COSOPT) 22.3-6.8 mg/mL ophthalmic solution Place 1 drop into the right eye 2 (two) times daily. (Patient not taking: Reported on 7/12/2023) 10 mL 1    prednisoLONE acetate (PRED FORTE) 1 % DrpS Place 1 drop into the right eye every 4 (four) hours. (Patient not taking: Reported on 7/12/2023) 5 mL 0     Current Facility-Administered Medications on File Prior to Visit   Medication Dose Route Frequency Provider Last Rate Last Admin    acetaminophen tablet 500 mg  500 mg Oral 1 time in Clinic/Han Arroyo MD           Allergies:  Patient has no known allergies.    Immunizations:  Immunization History   Administered Date(s) Administered    COVID-19, MRNA, LN-S, PF (Pfizer) (Purple Cap) 03/13/2021, 04/03/2021, 11/08/2021    COVID-19, mRNA, LNP-S, bivalent booster, PF (PFIZER OMICRON) 10/19/2022    Tdap 12/09/2022       Review of Systems:  Review of Systems   All other systems reviewed and are negative.      Objective:    Vitals:  Vitals:    08/10/23 1354   BP: 124/76   Pulse: 60   SpO2: 99%   Weight: 123.6 kg (272 lb 9.6 oz)   Height: 6' 1" (1.854 m)   PainSc: 0-No pain       Physical Exam  Vitals reviewed.   Constitutional:       General: He is not in acute distress.  HENT:      Head: Normocephalic and atraumatic.   Eyes:      Pupils: Pupils are equal, round, and reactive to light.   Cardiovascular:      Rate and Rhythm: Normal rate and regular rhythm.      Heart sounds: No murmur heard.     No friction rub.   Pulmonary:      Effort: Pulmonary effort is normal.      Breath sounds: Normal breath sounds.   Abdominal:      General: Bowel sounds are normal. There is no distension.      Palpations: Abdomen is soft.      Tenderness: There is no abdominal tenderness.   Musculoskeletal:      Cervical back: Neck supple.   Skin:     General: Skin is warm and dry.      Findings: No rash.   Psychiatric:         Behavior: Behavior normal.             Demarco Naranjo MD  Family Medicine      "

## 2023-08-11 LAB
ALBUMIN SERPL BCP-MCNC: 4.2 G/DL (ref 3.5–5.2)
ALP SERPL-CCNC: 65 U/L (ref 55–135)
ALT SERPL W/O P-5'-P-CCNC: 14 U/L (ref 10–44)
ANION GAP SERPL CALC-SCNC: 14 MMOL/L (ref 8–16)
AST SERPL-CCNC: 15 U/L (ref 10–40)
BASOPHILS # BLD AUTO: 0.02 K/UL (ref 0–0.2)
BASOPHILS NFR BLD: 0.4 % (ref 0–1.9)
BILIRUB SERPL-MCNC: 0.5 MG/DL (ref 0.1–1)
BUN SERPL-MCNC: 15 MG/DL (ref 6–20)
CALCIUM SERPL-MCNC: 9 MG/DL (ref 8.7–10.5)
CHLORIDE SERPL-SCNC: 107 MMOL/L (ref 95–110)
CHOLEST SERPL-MCNC: 163 MG/DL (ref 120–199)
CHOLEST/HDLC SERPL: 4 {RATIO} (ref 2–5)
CO2 SERPL-SCNC: 21 MMOL/L (ref 23–29)
CORTIS SERPL-MCNC: 8.4 UG/DL
CREAT SERPL-MCNC: 0.8 MG/DL (ref 0.5–1.4)
DIFFERENTIAL METHOD: ABNORMAL
EOSINOPHIL # BLD AUTO: 0.1 K/UL (ref 0–0.5)
EOSINOPHIL NFR BLD: 2.6 % (ref 0–8)
ERYTHROCYTE [DISTWIDTH] IN BLOOD BY AUTOMATED COUNT: 12.2 % (ref 11.5–14.5)
EST. GFR  (NO RACE VARIABLE): >60 ML/MIN/1.73 M^2
ESTIMATED AVG GLUCOSE: 100 MG/DL (ref 68–131)
GLUCOSE SERPL-MCNC: 86 MG/DL (ref 70–110)
HBA1C MFR BLD: 5.1 % (ref 4–5.6)
HCT VFR BLD AUTO: 39.9 % (ref 40–54)
HDLC SERPL-MCNC: 41 MG/DL (ref 40–75)
HDLC SERPL: 25.2 % (ref 20–50)
HGB BLD-MCNC: 13.5 G/DL (ref 14–18)
IMM GRANULOCYTES # BLD AUTO: 0.01 K/UL (ref 0–0.04)
IMM GRANULOCYTES NFR BLD AUTO: 0.2 % (ref 0–0.5)
LDLC SERPL CALC-MCNC: 86.6 MG/DL (ref 63–159)
LYMPHOCYTES # BLD AUTO: 1.9 K/UL (ref 1–4.8)
LYMPHOCYTES NFR BLD: 35.1 % (ref 18–48)
MCH RBC QN AUTO: 31.3 PG (ref 27–31)
MCHC RBC AUTO-ENTMCNC: 33.8 G/DL (ref 32–36)
MCV RBC AUTO: 92 FL (ref 82–98)
MONOCYTES # BLD AUTO: 0.4 K/UL (ref 0.3–1)
MONOCYTES NFR BLD: 7.9 % (ref 4–15)
NEUTROPHILS # BLD AUTO: 3 K/UL (ref 1.8–7.7)
NEUTROPHILS NFR BLD: 53.8 % (ref 38–73)
NONHDLC SERPL-MCNC: 122 MG/DL
NRBC BLD-RTO: 0 /100 WBC
PLATELET # BLD AUTO: 213 K/UL (ref 150–450)
PMV BLD AUTO: 10.7 FL (ref 9.2–12.9)
POTASSIUM SERPL-SCNC: 3.7 MMOL/L (ref 3.5–5.1)
PROT SERPL-MCNC: 7.1 G/DL (ref 6–8.4)
RBC # BLD AUTO: 4.32 M/UL (ref 4.6–6.2)
SODIUM SERPL-SCNC: 142 MMOL/L (ref 136–145)
T4 FREE SERPL-MCNC: 1.12 NG/DL (ref 0.71–1.51)
TRIGL SERPL-MCNC: 177 MG/DL (ref 30–150)
TSH SERPL DL<=0.005 MIU/L-ACNC: 2.31 UIU/ML (ref 0.4–4)
WBC # BLD AUTO: 5.47 K/UL (ref 3.9–12.7)

## 2023-08-16 ENCOUNTER — OFFICE VISIT (OUTPATIENT)
Dept: OPHTHALMOLOGY | Facility: CLINIC | Age: 43
End: 2023-08-16
Payer: COMMERCIAL

## 2023-08-16 DIAGNOSIS — H33.011 RETINAL DETACHMENT OF RIGHT EYE WITH SINGLE BREAK: Primary | ICD-10-CM

## 2023-08-16 DIAGNOSIS — H27.01 APHAKIA OF RIGHT EYE: ICD-10-CM

## 2023-08-16 DIAGNOSIS — H40.052 OCULAR HYPERTENSION OF LEFT EYE: ICD-10-CM

## 2023-08-16 PROCEDURE — 99024 POSTOP FOLLOW-UP VISIT: CPT | Mod: S$GLB,,, | Performed by: OPHTHALMOLOGY

## 2023-08-16 PROCEDURE — 99024 PR POST-OP FOLLOW-UP VISIT: ICD-10-PCS | Mod: S$GLB,,, | Performed by: OPHTHALMOLOGY

## 2023-08-16 PROCEDURE — 99999 PR PBB SHADOW E&M-EST. PATIENT-LVL II: CPT | Mod: PBBFAC,,, | Performed by: OPHTHALMOLOGY

## 2023-08-16 PROCEDURE — 99999 PR PBB SHADOW E&M-EST. PATIENT-LVL II: ICD-10-PCS | Mod: PBBFAC,,, | Performed by: OPHTHALMOLOGY

## 2023-08-16 NOTE — PROGRESS NOTES
HPI    Pt presents for one week post op DFE OD     States OD is doing well       Vigamox QID OD  Pred QID OD  Cyclo QID OD  Maxitrol QHS OD   Cosopt BID OD  Last edited by Lawanda Pederson on 8/16/2023  9:08 AM.          A/P    ICD-10-CM ICD-9-CM   1. Retinal detachment of right eye with single break  H33.011 361.01   2. Aphakia of right eye  H27.01 379.31   3. Ocular hypertension of left eye  H40.052 365.04         1. Retinal detachment of right eye with single break  2. Aphakia of right eye  Hx of globe injury (glass injury?), s/p PPV/PPL/open globe revision OD (2013)    Initial Visit: Presents with 1-2 days of blurred vision and finding of mac off RD  VA HM, bullous detachment with focal break at 10oclock with      S/p Cryo/Pneumo 6/16/23  S/p supplemental laser 6/19/23  S/p Cryo/Pneumo 7/6/23    Pre-Op - VA HM (was CF), IOP 15, gas about 5%, detached, no new break noted     Postop: s/p PPV/EL/Afx/15% C3F8 (Date 87/23 by Carlotta/Rohith/Jason) for Mac off RD  Positioning: Face down  Duration: 7 days    8/16/2023 VA HM (stable), IOP 25 (did not use cosopt today), retina flat, good 360 laser and at break/retinotomy    Instructions reviewed   - RD precautions  - Return for increasing pain/decreasing vision  - No getting the eye wet, no rubbing the eye, no heavy lifting for 1 month after surgery  Drops:  Vigamox  - 4 stop  Pred - 4(start weekly taper  Cyc -4 stop  Maxitrol - QHS stop  Continue cosopt 2      3. Ocular hypertension of left eye  Hx elevated IOP  Plan: Observation off gtt      RTC POM1 DFE OD ([place])    I saw and examined the patient and reviewed in detail the findings documented. The final examination findings, image interpretations, and plan as documented in the record represent my personal judgment and conclusions.    Han Laguerre MD  Vitreoretinal Surgery   [facility]  
English

## 2023-09-13 ENCOUNTER — OFFICE VISIT (OUTPATIENT)
Dept: OPHTHALMOLOGY | Facility: CLINIC | Age: 43
End: 2023-09-13
Payer: COMMERCIAL

## 2023-09-13 DIAGNOSIS — H33.011 RETINAL DETACHMENT OF RIGHT EYE WITH SINGLE BREAK: Primary | ICD-10-CM

## 2023-09-13 PROCEDURE — 99999 PR PBB SHADOW E&M-EST. PATIENT-LVL II: ICD-10-PCS | Mod: PBBFAC,,, | Performed by: OPHTHALMOLOGY

## 2023-09-13 PROCEDURE — 99024 POSTOP FOLLOW-UP VISIT: CPT | Mod: S$GLB,,, | Performed by: OPHTHALMOLOGY

## 2023-09-13 PROCEDURE — 99999 PR PBB SHADOW E&M-EST. PATIENT-LVL II: CPT | Mod: PBBFAC,,, | Performed by: OPHTHALMOLOGY

## 2023-09-13 PROCEDURE — 99024 PR POST-OP FOLLOW-UP VISIT: ICD-10-PCS | Mod: S$GLB,,, | Performed by: OPHTHALMOLOGY

## 2023-09-13 NOTE — PROGRESS NOTES
HPI    DLS: 1 m po RD repair OD.     Pt states not currently using cl OD. Needs to have ok today if possible.   Denies pain/ FOL. Bubble is moving. Can not tell if it is a floater.    States using cosopt BID as directed.   Last edited by Belinda Varma on 9/13/2023  9:42 AM.            A/P    ICD-10-CM ICD-9-CM   1. Retinal detachment of right eye with single break  H33.011 361.01       1. Retinal detachment of right eye with single break  2. Aphakia of right eye  Hx of globe injury (glass injury?), s/p PPV/PPL/open globe revision OD (2013)    Initial Visit: Presents with 1-2 days of blurred vision and finding of mac off RD  VA HM, bullous detachment with focal break at 10oclock with      S/p Cryo/Pneumo 6/16/23  S/p supplemental laser 6/19/23  S/p Cryo/Pneumo 7/6/23    Pre-Op - VA HM (was CF), IOP 15, gas about 5%, detached, no new break noted     Postop: s/p PPV/EL/Afx/15% C3F8 (Date 8/7/23 by Carlotta/Rohith/Jason) for Mac off RD  Positioning: Face down  Duration: 7 days    9/13/2023 VA 20/600 sc (was  HM), IOP 14  retina flat, good 360 laser and at break/retinotomy, partial gas about 20% almost gone    Observe, ok to return back to work    Instructions reviewed   - RD precautions  - Return for increasing pain/decreasing vision  - No getting the eye wet, no rubbing the eye, no heavy lifting for 1 month after surgery  Drops:     D/c  cosopt 2      3. Ocular hypertension of left eye  Hx elevated IOP  Plan: Observation off gtt      RTC 6-8 weeks DFE OD ([place])    I saw and examined the patient and reviewed in detail the findings documented. The final examination findings, image interpretations, and plan as documented in the record represent my personal judgment and conclusions.    Han Laguerre MD  Vitreoretinal Surgery   [facility]

## 2023-10-25 ENCOUNTER — OFFICE VISIT (OUTPATIENT)
Dept: OPHTHALMOLOGY | Facility: CLINIC | Age: 43
End: 2023-10-25
Payer: COMMERCIAL

## 2023-10-25 DIAGNOSIS — H27.01 APHAKIA OF RIGHT EYE: ICD-10-CM

## 2023-10-25 DIAGNOSIS — H40.052 OCULAR HYPERTENSION OF LEFT EYE: ICD-10-CM

## 2023-10-25 DIAGNOSIS — H33.011 RETINAL DETACHMENT OF RIGHT EYE WITH SINGLE BREAK: Primary | ICD-10-CM

## 2023-10-25 PROCEDURE — 99999 PR PBB SHADOW E&M-EST. PATIENT-LVL II: CPT | Mod: PBBFAC,,, | Performed by: OPHTHALMOLOGY

## 2023-10-25 PROCEDURE — 99024 POSTOP FOLLOW-UP VISIT: CPT | Mod: S$GLB,,, | Performed by: OPHTHALMOLOGY

## 2023-10-25 PROCEDURE — 99024 PR POST-OP FOLLOW-UP VISIT: ICD-10-PCS | Mod: S$GLB,,, | Performed by: OPHTHALMOLOGY

## 2023-10-25 PROCEDURE — 99999 PR PBB SHADOW E&M-EST. PATIENT-LVL II: ICD-10-PCS | Mod: PBBFAC,,, | Performed by: OPHTHALMOLOGY

## 2023-10-25 NOTE — PROGRESS NOTES
A/P    ICD-10-CM ICD-9-CM   1. Retinal detachment of right eye with single break  H33.011 361.01   2. Aphakia of right eye  H27.01 379.31   3. Ocular hypertension of left eye  H40.052 365.04         1. Retinal detachment of right eye with single break  2. Aphakia of right eye  Hx of globe injury (glass injury?), s/p PPV/PPL/open globe revision OD (2013)    Initial Visit: Presents with 1-2 days of blurred vision and finding of mac off RD  VA HM, bullous detachment with focal break at 10oclock with      S/p Cryo/Pneumo 6/16/23  S/p supplemental laser 6/19/23  S/p Cryo/Pneumo 7/6/23    Pre-Op - VA HM (was CF), IOP 15, gas about 5%, detached, no new break noted     Postop: s/p PPV/EL/Afx/15% C3F8 (Date 8/7/23 by Carlotta/Rohith/Jason) for Mac off RD  Positioning: Face down  Duration: 7 days    10/25/2023 VA 20/600 sc (stable), IOP 22  retina flat, good 360 laser and at break/retinotomy, gas resolved    Observe will get OCT next visit     Instructions reviewed   - RD precautions  - Return for increasing pain/decreasing vision  - No getting the eye wet, no rubbing the eye, no heavy lifting for 1 month after surgery         3. Ocular hypertension of left eye  Hx elevated IOP  Plan: Observation off gtt      RTC 6-8 weeks DFE/OCTm OU  ([place])    I saw and examined the patient and reviewed in detail the findings documented. The final examination findings, image interpretations, and plan as documented in the record represent my personal judgment and conclusions.    Han Laguerre MD  Vitreoretinal Surgery   [facility]

## 2023-12-04 ENCOUNTER — TELEPHONE (OUTPATIENT)
Dept: OPHTHALMOLOGY | Facility: CLINIC | Age: 43
End: 2023-12-04
Payer: COMMERCIAL

## 2024-01-16 PROBLEM — H35.033 HYPERTENSIVE RETINOPATHY OF BOTH EYES: Status: ACTIVE | Noted: 2024-01-16

## 2024-01-16 NOTE — PROGRESS NOTES
HPI    Dls: 10/25/2023- 6m s/p PPV OS     Pt states no new complaints. Denies pain/ FOL/ floaters.     No gtts.     Last edited by Belinda Varma on 1/17/2024  2:41 PM.             A/P    ICD-10-CM ICD-9-CM   1. Retinal detachment of right eye with single break  H33.011 361.01   2. Aphakia of right eye  H27.01 379.31   3. Ocular hypertension of left eye  H40.052 365.04   4. Hypertensive retinopathy of both eyes  H35.033 362.11           1. Retinal detachment of right eye with single break  2. Aphakia of right eye  Hx of globe injury (glass injury?), s/p PPV/PPL/open globe revision OD (2013)    Initial Visit: Presents with 1-2 days of blurred vision and finding of mac off RD  VA HM, bullous detachment with focal break at 10oclock with      S/p Cryo/Pneumo 6/16/23  S/p supplemental laser 6/19/23  S/p Cryo/Pneumo 7/6/23    Pre-Op - VA HM (was CF), IOP 15, gas about 5%, detached, no new break noted     Postop: s/p PPV/EL/Afx/15% C3F8 (Date 8/7/23 by Carlotta/Rohith/Jason) for Mac off RD  Positioning: Face down  Duration: 7 days    1/16/2024 VA 20/600 sc but gets to 20/70 with +10 D lens (stable), IOP 23  retina flat, good 360 laser and at break/retinotomy, attached    Observe , will refer for updated Mrx CTL with Dr Boyle     Instructions reviewed   - RD precautions  - Return for increasing pain/decreasing vision  - No getting the eye wet, no rubbing the eye, no heavy lifting for 1 month after surgery         3. Ocular hypertension of left eye  Hx elevated IOP  Plan: Observation off gtt      4. Hypertensive retinopathy of both eyes  Pcp Demarco Naranjo MD - Recent notes reviewed  08/10/2023  5.1  A1C   Mild HTN changes  Plan: Observation  Recommend good blood pressure control, tight blood glucose control, and good cholesterol control         RTC  6 mo DFE/OCTm OU   RTC Alexandra for Mrx     I saw and examined the patient and reviewed in detail the findings documented. The final examination findings, image interpretations,  and plan as documented in the record represent my personal judgment and conclusions.    Han Laguerre MD  Vitreoretinal Surgery   [facility]

## 2024-01-17 ENCOUNTER — OFFICE VISIT (OUTPATIENT)
Dept: OPHTHALMOLOGY | Facility: CLINIC | Age: 44
End: 2024-01-17
Payer: COMMERCIAL

## 2024-01-17 DIAGNOSIS — H33.011 RETINAL DETACHMENT OF RIGHT EYE WITH SINGLE BREAK: Primary | ICD-10-CM

## 2024-01-17 DIAGNOSIS — H35.033 HYPERTENSIVE RETINOPATHY OF BOTH EYES: ICD-10-CM

## 2024-01-17 DIAGNOSIS — H27.01 APHAKIA OF RIGHT EYE: ICD-10-CM

## 2024-01-17 DIAGNOSIS — H40.052 OCULAR HYPERTENSION OF LEFT EYE: ICD-10-CM

## 2024-01-17 PROCEDURE — 99214 OFFICE O/P EST MOD 30 MIN: CPT | Mod: S$GLB,,, | Performed by: OPHTHALMOLOGY

## 2024-01-17 PROCEDURE — 92134 CPTRZ OPH DX IMG PST SGM RTA: CPT | Mod: S$GLB,,, | Performed by: OPHTHALMOLOGY

## 2024-01-17 PROCEDURE — 92201 OPSCPY EXTND RTA DRAW UNI/BI: CPT | Mod: S$GLB,,, | Performed by: OPHTHALMOLOGY

## 2024-01-17 PROCEDURE — 99999 PR PBB SHADOW E&M-EST. PATIENT-LVL III: CPT | Mod: PBBFAC,,, | Performed by: OPHTHALMOLOGY

## 2024-02-07 DIAGNOSIS — I10 ESSENTIAL HYPERTENSION: ICD-10-CM

## 2024-02-07 RX ORDER — OLMESARTAN MEDOXOMIL 20 MG/1
20 TABLET ORAL
Qty: 90 TABLET | Refills: 1 | Status: SHIPPED | OUTPATIENT
Start: 2024-02-07

## 2024-02-07 NOTE — TELEPHONE ENCOUNTER
No care due was identified.  [facility] Embedded Care Due Messages. Reference number: [id number].   2/07/2024 2:32:43 AM CST

## 2024-02-07 NOTE — TELEPHONE ENCOUNTER
Refill Decision Note   Yung Joy  is requesting a refill authorization.  Brief Assessment and Rationale for Refill:  Approve     Medication Therapy Plan:         Comments:     Note composed:4:40 AM 02/07/2024

## 2024-05-01 ENCOUNTER — OFFICE VISIT (OUTPATIENT)
Dept: OPTOMETRY | Facility: CLINIC | Age: 44
End: 2024-05-01
Payer: COMMERCIAL

## 2024-05-01 DIAGNOSIS — H27.01 APHAKIA OF RIGHT EYE: ICD-10-CM

## 2024-05-01 DIAGNOSIS — H33.011 RETINAL DETACHMENT OF RIGHT EYE WITH SINGLE BREAK: Primary | ICD-10-CM

## 2024-05-01 DIAGNOSIS — Z46.0 CONTACT LENS/GLASSES FITTING: Primary | ICD-10-CM

## 2024-05-01 DIAGNOSIS — H52.7 REFRACTIVE ERROR: ICD-10-CM

## 2024-05-01 PROCEDURE — 99499 UNLISTED E&M SERVICE: CPT | Mod: ,,, | Performed by: OPTOMETRIST

## 2024-05-01 PROCEDURE — 92310 CONTACT LENS FITTING OU: CPT | Mod: SC,CSM,S$GLB, | Performed by: OPTOMETRIST

## 2024-05-01 PROCEDURE — 92015 DETERMINE REFRACTIVE STATE: CPT | Mod: S$GLB,,, | Performed by: OPTOMETRIST

## 2024-05-01 PROCEDURE — 99999 PR PBB SHADOW E&M-EST. PATIENT-LVL II: CPT | Mod: PBBFAC,,, | Performed by: OPTOMETRIST

## 2024-05-01 PROCEDURE — 92014 COMPRE OPH EXAM EST PT 1/>: CPT | Mod: S$GLB,,, | Performed by: OPTOMETRIST

## 2024-05-01 PROCEDURE — 99999 PR PBB SHADOW E&M-EST. PATIENT-LVL III: CPT | Mod: PBBFAC,,, | Performed by: OPTOMETRIST

## 2024-05-01 NOTE — PROGRESS NOTES
HPI    42 YO male presents today for an updated ctl/glasses prescription. Patient   notes that he primarily wears ctl daily about 12-14 hours and does not   sleep in lenses. Trauma to OD about 10 years ago and current glasses are   from prior to that. Notes prescription in eyes are too different to get   glasses script.   Last edited by Tere Keith on 5/1/2024  1:28 PM.            Assessment /Plan     For exam results, see Encounter Report.    Retinal detachment of right eye with single break    Aphakia of right eye    Refractive error      1. Retinal detachment of right eye with single break  Continue f/u with Dr. Laguerre as directed    2. Aphakia of right eye  Stable OD  Discussed options, declines IOL consult at this time    3. Refractive error  Dispensed updated spectacle Rx -- plano OD for aniseikonia   Polycarb lenses, discussed monocular precautions     Discussed cls options  Ordered biofinity toric xr / biofinity sph  Return for dispense

## 2024-05-01 NOTE — PROGRESS NOTES
Assessment /Plan     For exam results, see Encounter Report.    Contact lens/glasses fitting      Patient is here for a comprehensive eye exam and contact lens fit. See other exam visit with same encounter date 05/01/2024 for detailed exam information.

## 2024-05-05 DIAGNOSIS — G47.26 SHIFT WORK SLEEP DISORDER: ICD-10-CM

## 2024-05-05 RX ORDER — TRAZODONE HYDROCHLORIDE 100 MG/1
100 TABLET ORAL NIGHTLY
Qty: 90 TABLET | Refills: 1 | Status: SHIPPED | OUTPATIENT
Start: 2024-05-05

## 2024-05-05 NOTE — TELEPHONE ENCOUNTER
Yung Hamilton  is requesting a refill authorization.  Brief Assessment and Rationale for Refill:  Approve     Medication Therapy Plan:         Comments:     Note composed:6:41 PM 05/05/2024

## 2024-05-05 NOTE — TELEPHONE ENCOUNTER
No care due was identified.  [facility] Embedded Care Due Messages. Reference number: [id number].   5/05/2024 8:03:23 AM CDT

## 2024-06-11 ENCOUNTER — OFFICE VISIT (OUTPATIENT)
Dept: OPTOMETRY | Facility: CLINIC | Age: 44
End: 2024-06-11
Payer: COMMERCIAL

## 2024-06-11 DIAGNOSIS — Z97.3 WEARS CONTACT LENSES: Primary | ICD-10-CM

## 2024-06-11 PROCEDURE — 99499 UNLISTED E&M SERVICE: CPT | Mod: S$GLB,,, | Performed by: OPTOMETRIST

## 2024-06-11 NOTE — PROGRESS NOTES
HPI    Pt here today for cls follow up.   Had pt insert trials OU.   States good   fit & comfort both physically & visually.    Last edited by Mis Pryor on 6/11/2024  3:57 PM.            Assessment /Plan     For exam results, see Encounter Report.    Wears contact lenses      Dispensed trials -- pt adjusting to new mercedes  Reports good comfort, overall improvement to vision  Dispensed CLs trials: Biofinity Toric / sph. Daily wear only, dispose of monthly.   Discussed proper hand hygiene and wear/care of lenses. Do not sleep/swim/shower in lenses.   Discontinue CL wear ASAP and RTC if any redness or discomfort occurs.   Try x week, report back with progress    ** addendum 6/19/24: pt doing well, finalized rx per request **

## 2024-07-01 ENCOUNTER — TELEPHONE (OUTPATIENT)
Dept: OPHTHALMOLOGY | Facility: CLINIC | Age: 44
End: 2024-07-01
Payer: COMMERCIAL

## 2024-07-01 NOTE — TELEPHONE ENCOUNTER
Nano morrison On license of [facility] Staff  Caller: Unspecified (Today,  3:39 PM)  Type:  Sooner Appointment Request    Caller is requesting a sooner appointment.  Caller declined first available appointment listed below.  Caller will not accept being placed on the waitlist and is requesting a message be sent to doctor.    Name of Caller:  pt  When is the first available appointment?  Needs resched  Symptoms:  f/u  Would the patient rather a call back or a response via [facility]? call  Best Call Back Number:  [phone] (home)    Additional Information:  please advise  Called pt and rich appt.

## 2024-07-31 ENCOUNTER — OFFICE VISIT (OUTPATIENT)
Dept: OPHTHALMOLOGY | Facility: CLINIC | Age: 44
End: 2024-07-31
Payer: COMMERCIAL

## 2024-07-31 DIAGNOSIS — H27.01 APHAKIA OF RIGHT EYE: ICD-10-CM

## 2024-07-31 DIAGNOSIS — H35.033 HYPERTENSIVE RETINOPATHY OF BOTH EYES: ICD-10-CM

## 2024-07-31 DIAGNOSIS — H33.011 RETINAL DETACHMENT OF RIGHT EYE WITH SINGLE BREAK: Primary | ICD-10-CM

## 2024-07-31 DIAGNOSIS — H40.052 OCULAR HYPERTENSION OF LEFT EYE: ICD-10-CM

## 2024-07-31 PROCEDURE — 99999 PR PBB SHADOW E&M-EST. PATIENT-LVL III: CPT | Mod: PBBFAC,,, | Performed by: OPHTHALMOLOGY

## 2024-07-31 PROCEDURE — 92202 OPSCPY EXTND ON/MAC DRAW: CPT | Mod: 59,S$GLB,, | Performed by: OPHTHALMOLOGY

## 2024-07-31 PROCEDURE — 92014 COMPRE OPH EXAM EST PT 1/>: CPT | Mod: S$GLB,,, | Performed by: OPHTHALMOLOGY

## 2024-07-31 PROCEDURE — 92134 CPTRZ OPH DX IMG PST SGM RTA: CPT | Mod: S$GLB,,, | Performed by: OPHTHALMOLOGY

## 2024-07-31 NOTE — PROGRESS NOTES
HPI    VA OD no changes since last visit. Able to drive at night with OD  VA OS good no changes since last visit.  Eye meds: None  Last edited by Anaid Mcnamara on 7/31/2024  2:20 PM.              A/P    ICD-10-CM ICD-9-CM   1. Retinal detachment of right eye with single break  H33.011 361.01   2. Aphakia of right eye  H27.01 379.31   3. Ocular hypertension of left eye  H40.052 365.04   4. Hypertensive retinopathy of both eyes  H35.033 362.11       1. Retinal detachment of right eye with single break  2. Aphakia of right eye  Hx of globe injury (glass injury?), s/p PPV/PPL/open globe revision OD (2013)    Initial Visit: Presents with 1-2 days of blurred vision and finding of mac off RD  VA HM, bullous detachment with focal break at 10oclock with      S/p Cryo/Pneumo 6/16/23  S/p supplemental laser 6/19/23  S/p Cryo/Pneumo 7/6/23    Pre-Op - VA HM (was CF), IOP 15, gas about 5%, detached, no new break noted     Postop: s/p PPV/EL/Afx/15% C3F8 (Date 8/7/23 by Carlotta/Rohith/Jason) for Mac off RD       7/31/2024 VA 20/150 ph (was 20/50 recently with CTL), IOP 23,retina flat, good 360 laser and at break/retinotomy, attached    Observe     Instructions reviewed   - RD precautions  - Return for increasing pain/decreasing vision  - No getting the eye wet, no rubbing the eye, no heavy lifting for 1 month after surgery         3. Ocular hypertension of left eye  Hx elevated IOP  Plan: Observation off gtt      4. Hypertensive retinopathy of both eyes  Pcp Demarco Naranjo MD - Recent notes reviewed  08/10/2023  5.1  A1C   Mild HTN changes  Plan: Observation  Recommend good blood pressure control, tight blood glucose control, and good cholesterol control         RTC  12 mo DFE/OCTm OU         I saw and examined the patient and reviewed in detail the findings documented. The final examination findings, image interpretations, and plan as documented in the record represent my personal judgment and conclusions.    Han Laguerre,  MD  Vitreoretinal Surgery   [facility]

## 2024-09-05 ENCOUNTER — PATIENT MESSAGE (OUTPATIENT)
Dept: ADMINISTRATIVE | Facility: HOSPITAL | Age: 44
End: 2024-09-05
Payer: COMMERCIAL

## 2024-10-27 DIAGNOSIS — I10 ESSENTIAL HYPERTENSION: ICD-10-CM

## 2024-10-28 RX ORDER — OLMESARTAN MEDOXOMIL 20 MG/1
20 TABLET ORAL
Qty: 90 TABLET | Refills: 3 | Status: SHIPPED | OUTPATIENT
Start: 2024-10-28

## 2024-10-30 DIAGNOSIS — G47.26 SHIFT WORK SLEEP DISORDER: ICD-10-CM

## 2024-10-30 RX ORDER — TRAZODONE HYDROCHLORIDE 100 MG/1
100 TABLET ORAL NIGHTLY
Qty: 90 TABLET | Refills: 0 | Status: SHIPPED | OUTPATIENT
Start: 2024-10-30

## 2024-11-27 ENCOUNTER — OFFICE VISIT (OUTPATIENT)
Dept: FAMILY MEDICINE | Facility: CLINIC | Age: 44
End: 2024-11-27
Payer: COMMERCIAL

## 2024-11-27 ENCOUNTER — LAB VISIT (OUTPATIENT)
Dept: LAB | Facility: HOSPITAL | Age: 44
End: 2024-11-27
Attending: FAMILY MEDICINE
Payer: COMMERCIAL

## 2024-11-27 VITALS
RESPIRATION RATE: 15 BRPM | OXYGEN SATURATION: 98 % | BODY MASS INDEX: 30.66 KG/M2 | SYSTOLIC BLOOD PRESSURE: 102 MMHG | HEIGHT: 73 IN | DIASTOLIC BLOOD PRESSURE: 64 MMHG | HEART RATE: 87 BPM | WEIGHT: 231.38 LBS

## 2024-11-27 DIAGNOSIS — Z00.00 WELLNESS EXAMINATION: ICD-10-CM

## 2024-11-27 DIAGNOSIS — L72.3 SEBACEOUS CYST: ICD-10-CM

## 2024-11-27 DIAGNOSIS — F32.0 CURRENT MILD EPISODE OF MAJOR DEPRESSIVE DISORDER WITHOUT PRIOR EPISODE: ICD-10-CM

## 2024-11-27 DIAGNOSIS — R63.5 WEIGHT GAIN: ICD-10-CM

## 2024-11-27 DIAGNOSIS — F17.211 CIGARETTE NICOTINE DEPENDENCE IN REMISSION: ICD-10-CM

## 2024-11-27 DIAGNOSIS — I10 ESSENTIAL HYPERTENSION: ICD-10-CM

## 2024-11-27 DIAGNOSIS — Z23 IMMUNIZATION DUE: ICD-10-CM

## 2024-11-27 DIAGNOSIS — Z00.00 WELLNESS EXAMINATION: Primary | ICD-10-CM

## 2024-11-27 LAB
ALBUMIN SERPL BCP-MCNC: 4.5 G/DL (ref 3.5–5.2)
ALP SERPL-CCNC: 65 U/L (ref 40–150)
ALT SERPL W/O P-5'-P-CCNC: 8 U/L (ref 10–44)
ANION GAP SERPL CALC-SCNC: 8 MMOL/L (ref 8–16)
AST SERPL-CCNC: 10 U/L (ref 10–40)
BASOPHILS # BLD AUTO: 0.01 K/UL (ref 0–0.2)
BASOPHILS NFR BLD: 0.2 % (ref 0–1.9)
BILIRUB SERPL-MCNC: 0.4 MG/DL (ref 0.1–1)
BUN SERPL-MCNC: 15 MG/DL (ref 6–20)
CALCIUM SERPL-MCNC: 9.4 MG/DL (ref 8.7–10.5)
CHLORIDE SERPL-SCNC: 106 MMOL/L (ref 95–110)
CHOLEST SERPL-MCNC: 162 MG/DL (ref 120–199)
CHOLEST/HDLC SERPL: 3.4 {RATIO} (ref 2–5)
CO2 SERPL-SCNC: 25 MMOL/L (ref 23–29)
CREAT SERPL-MCNC: 0.9 MG/DL (ref 0.5–1.4)
DIFFERENTIAL METHOD BLD: ABNORMAL
EOSINOPHIL # BLD AUTO: 0.1 K/UL (ref 0–0.5)
EOSINOPHIL NFR BLD: 1.6 % (ref 0–8)
ERYTHROCYTE [DISTWIDTH] IN BLOOD BY AUTOMATED COUNT: 12 % (ref 11.5–14.5)
EST. GFR  (NO RACE VARIABLE): >60 ML/MIN/1.73 M^2
ESTIMATED AVG GLUCOSE: 108 MG/DL (ref 68–131)
GLUCOSE SERPL-MCNC: 102 MG/DL (ref 70–110)
HBA1C MFR BLD: 5.4 % (ref 4–5.6)
HCT VFR BLD AUTO: 40.9 % (ref 40–54)
HDLC SERPL-MCNC: 48 MG/DL (ref 40–75)
HDLC SERPL: 29.6 % (ref 20–50)
HGB BLD-MCNC: 13.4 G/DL (ref 14–18)
IMM GRANULOCYTES # BLD AUTO: 0.02 K/UL (ref 0–0.04)
IMM GRANULOCYTES NFR BLD AUTO: 0.4 % (ref 0–0.5)
LDLC SERPL CALC-MCNC: 95.2 MG/DL (ref 63–159)
LYMPHOCYTES # BLD AUTO: 1.5 K/UL (ref 1–4.8)
LYMPHOCYTES NFR BLD: 28.7 % (ref 18–48)
MCH RBC QN AUTO: 31.4 PG (ref 27–31)
MCHC RBC AUTO-ENTMCNC: 32.8 G/DL (ref 32–36)
MCV RBC AUTO: 96 FL (ref 82–98)
MONOCYTES # BLD AUTO: 0.4 K/UL (ref 0.3–1)
MONOCYTES NFR BLD: 7.4 % (ref 4–15)
NEUTROPHILS # BLD AUTO: 3.2 K/UL (ref 1.8–7.7)
NEUTROPHILS NFR BLD: 61.7 % (ref 38–73)
NONHDLC SERPL-MCNC: 114 MG/DL
NRBC BLD-RTO: 0 /100 WBC
PLATELET # BLD AUTO: 192 K/UL (ref 150–450)
PMV BLD AUTO: 11.1 FL (ref 9.2–12.9)
POTASSIUM SERPL-SCNC: 3.8 MMOL/L (ref 3.5–5.1)
PROT SERPL-MCNC: 7.1 G/DL (ref 6–8.4)
RBC # BLD AUTO: 4.27 M/UL (ref 4.6–6.2)
SODIUM SERPL-SCNC: 139 MMOL/L (ref 136–145)
T4 FREE SERPL-MCNC: 0.94 NG/DL (ref 0.71–1.51)
TRIGL SERPL-MCNC: 94 MG/DL (ref 30–150)
TSH SERPL DL<=0.005 MIU/L-ACNC: 1.3 UIU/ML (ref 0.4–4)
WBC # BLD AUTO: 5.13 K/UL (ref 3.9–12.7)

## 2024-11-27 PROCEDURE — 80061 LIPID PANEL: CPT | Performed by: FAMILY MEDICINE

## 2024-11-27 PROCEDURE — 84439 ASSAY OF FREE THYROXINE: CPT | Performed by: FAMILY MEDICINE

## 2024-11-27 PROCEDURE — 36415 COLL VENOUS BLD VENIPUNCTURE: CPT | Mod: PN | Performed by: FAMILY MEDICINE

## 2024-11-27 PROCEDURE — 80053 COMPREHEN METABOLIC PANEL: CPT | Performed by: FAMILY MEDICINE

## 2024-11-27 PROCEDURE — 83036 HEMOGLOBIN GLYCOSYLATED A1C: CPT | Performed by: FAMILY MEDICINE

## 2024-11-27 PROCEDURE — 85025 COMPLETE CBC W/AUTO DIFF WBC: CPT | Performed by: FAMILY MEDICINE

## 2024-11-27 PROCEDURE — 90656 IIV3 VACC NO PRSV 0.5 ML IM: CPT | Mod: S$GLB,,, | Performed by: FAMILY MEDICINE

## 2024-11-27 PROCEDURE — 99396 PREV VISIT EST AGE 40-64: CPT | Mod: 25,S$GLB,, | Performed by: FAMILY MEDICINE

## 2024-11-27 PROCEDURE — 99999 PR PBB SHADOW E&M-EST. PATIENT-LVL III: CPT | Mod: PBBFAC,,, | Performed by: FAMILY MEDICINE

## 2024-11-27 PROCEDURE — 84443 ASSAY THYROID STIM HORMONE: CPT | Performed by: FAMILY MEDICINE

## 2024-11-27 PROCEDURE — 90471 IMMUNIZATION ADMIN: CPT | Mod: S$GLB,,, | Performed by: FAMILY MEDICINE

## 2024-11-27 RX ORDER — OLMESARTAN MEDOXOMIL 20 MG/1
10 TABLET ORAL DAILY
Start: 2024-11-27

## 2024-11-27 NOTE — PROGRESS NOTES
THIS DOCUMENT WAS MADE IN PART WITH VOICE RECOGNITION SOFTWARE.  OCCASIONALLY THIS SOFTWARE WILL MISINTERPRET WORDS OR PHRASES.    Assessment and Plan:    1. Wellness examination  Urinalysis Microscopic    T4, Free    TSH    Urinalysis, Reflex to Urine Culture Urine, Clean Catch    Hemoglobin A1C    Lipid Panel    Comprehensive Metabolic Panel    CBC Auto Differential      2. Immunization due        3. Cigarette nicotine dependence in remission        4. Essential hypertension  olmesartan (BENICAR) 20 MG tablet      5. Current mild episode of major depressive disorder without prior episode  Ambulatory referral/consult to Psychology      6. Weight gain  T4, Free    TSH      7. Sebaceous cyst                 Assessment & Plan    PLAN SUMMARY:   Referred to psychology/therapy for depression, PRN referral valid for 1 year   Flu shot administered in office   Follow up for blood pressure control on reduced medication dose   Schedule procedure for sebaceous cyst removal when ready   Continue Trazodone as needed for insomnia   Ordered CBC, CMP, lipid panel, and thyroid level for routine wellness   Contact office if depression symptoms worsen or to start depression medication   Continue current weight management approach   Decreased Olmesartan from 20mg to 10mg daily    ESSENTIAL HYPERTENSION:   Decreased Olmesartan from 20mg to 10mg daily.   Follow up regarding blood pressure control on reduced medication dose.    DEPRESSION:   Referred to psychology/therapy for depression, PRN referral valid for 1 year.   Contact office if depression symptoms worsen or if patient decides to start medication for depression.   Continued Trazodone as needed for insomnia, patient typically takes 1.5 tablets when needed.    WEIGHT MANAGEMENT:   Discussed intermittent fasting and its potential effects on weight and blood pressure.   Yung to continue current weight management approach, ensuring it does not lead to lightheadedness.    SEBACEOUS  CYST:   Explained the nature of sebaceous cysts, including potential for growth, infection, and treatment options.   Message the office when ready to schedule procedure for sebaceous cyst removal.    GENERAL ADULT MEDICAL EXAMINATION:   CBC, CMP, lipid panel, and thyroid level ordered as routine wellness labs.   Flu shot administered in office.               ______________________________________________________________________  Subjective:    Chief Complaint:  Chief Complaint   Patient presents with    Annual Exam        HPI:  Yung is a 44 y.o. year old       History of Present Illness    CHIEF COMPLAINT:  Yung presents today for an annual checkup.    CARDIOVASCULAR:  He reports feeling lightheaded yesterday after taking two doses of Olmesartan 20 mg, which he takes for blood pressure management.    DEPRESSION:  He describes his current depressive state as more of a numbness, different from how he has felt in the past. He acknowledges experiencing trauma that needs to be addressed, particularly the loss of foster children he and his wife were caring for. He denies current suicidal ideation or thoughts of self-harm. His wife encourages him to seek professional help, but he is not ready to pursue counseling at this time, considering it as a possibility for next year. He reports recent weight loss attributed to depression.    MEDICATIONS:  He is currently taking Olmesartan 20 mg for blood pressure management and Trazodone as needed for insomnia.    SOCIAL HISTORY:  He quit tobacco over 20 years ago.    DERMATOLOGICAL:  His wife noticed a growing sebaceous cyst on his back.      ROS:  ROS as indicated in HPI.           Eye pathology  History of traumatic cataract, ruptured globe, episcleritis, corneal perforation, aphakia     Tobacco Dep.   Quit 18 - 20 years ago   4 py total    Insomnia  Rx : Trazodone 100 mg PRN (works well)     Ess Hypertension  Rx : Benicar 20  Normotensive at home               Past Medical  "History:  Past Medical History:   Diagnosis Date    Aphakia of right eye     Corneal perforation 9/8/13    right eye    Episcleritis of left eye 9/25/14    Pupil irregularity     Ruptured globe of right eye 9/8/13    Traumatic cataract of right eye        Past Surgical History:  Past Surgical History:   Procedure Laterality Date    CATARACT EXTRACTION      aphakia right eye    EYE SURGERY  2013    REPAIR OF RETINAL DETACHMENT WITH VITRECTOMY Right 8/7/2023    Procedure: REPAIR RETINAL DETACHMENT WITH 25G VITRECTOMY AND GAS EXCHANGE: C3F8;  Surgeon: Han Laguerre MD;  Location: [facility] OR [address] [place] [address];  Service: Ophthalmology;  Laterality: Right;    TONSILLECTOMY, ADENOIDECTOMY  y-28       Family History:  Family History   Problem Relation Name Age of Onset    Cancer Mother China Martinez         sarcoma    Cancer Maternal Grandmother Erin Martinez     Cancer Paternal Grandmother Shweta Schexnaydre     Diabetes Paternal Grandmother Shweta Schexnaydre     Diabetes Paternal Grandfather Urban Schexnaydre        Social History:  Social History     Socioeconomic History    Marital status:      Spouse name: Maria L    Number of children: 3   Tobacco Use    Smoking status: Former     Current packs/day: 1.00     Average packs/day: 1 pack/day for 4.0 years (4.0 ttl pk-yrs)     Types: Cigarettes    Smokeless tobacco: Never   Substance and Sexual Activity    Alcohol use: Yes     Alcohol/week: 1.0 standard drink of alcohol     Types: 1 Drinks containing 0.5 oz of alcohol per week     Comment: occasionaly    Drug use: No    Sexual activity: Yes     Partners: Female     Birth control/protection: None   Social History Narrative    Job : [employer] :      Diet : Normal     Exercise : "trying, but I work shift work"      Social Drivers of Health     Financial Resource Strain: Low Risk  (11/26/2024)    Overall Financial Resource Strain (CARDIA)     Difficulty of Paying Living Expenses: Not very hard   Food Insecurity: " No Food Insecurity (11/26/2024)    Hunger Vital Sign     Worried About Running Out of Food in the Last Year: Never true     Ran Out of Food in the Last Year: Never true   Physical Activity: Insufficiently Active (11/26/2024)    Exercise Vital Sign     Days of Exercise per Week: 3 days     Minutes of Exercise per Session: 20 min   Stress: Stress Concern Present (11/26/2024)    [employer] - Occupational Stress Questionnaire     Feeling of Stress : Rather much   Housing Stability: Unknown (11/26/2024)    Housing Stability Vital Sign     Unable to Pay for Housing in the Last Year: No       Medications:  Current Outpatient Medications on File Prior to Visit   Medication Sig Dispense Refill    traZODone (DESYREL) 100 MG tablet TAKE 1 TABLET BY MOUTH EVERY EVENING. 90 tablet 0    [DISCONTINUED] olmesartan (BENICAR) 20 MG tablet TAKE 1 TABLET BY MOUTH EVERY DAY 90 tablet 3    [DISCONTINUED] ALPRAZolam (XANAX) 0.5 MG tablet Take 1 tablet (0.5 mg total) by mouth daily as needed for Anxiety. 30 tablet 0    [DISCONTINUED] dorzolamide-timolol 2-0.5% (COSOPT) 22.3-6.8 mg/mL ophthalmic solution Place 1 drop into the right eye 2 (two) times daily. 10 mL 1     Current Facility-Administered Medications on File Prior to Visit   Medication Dose Route Frequency Provider Last Rate Last Admin    [DISCONTINUED] acetaminophen tablet 500 mg  500 mg Oral 1 time in Clinic/HOD Han Laguerre MD           Allergies:  Patient has no known allergies.    Immunizations:  Immunization History   Administered Date(s) Administered    Tdap 12/09/2022       Review of Systems:  Review of Systems   Constitutional:  Negative for activity change and unexpected weight change.   HENT:  Negative for hearing loss, rhinorrhea and trouble swallowing.    Eyes:  Negative for discharge and visual disturbance.   Respiratory:  Negative for chest tightness and wheezing.    Cardiovascular:  Negative for chest pain and palpitations.  "  Gastrointestinal:  Negative for blood in stool, constipation, diarrhea and vomiting.   Endocrine: Negative for polydipsia and polyuria.   Genitourinary:  Negative for difficulty urinating, hematuria and urgency.   Musculoskeletal:  Negative for arthralgias, joint swelling and neck pain.   Neurological:  Negative for weakness and headaches.   Psychiatric/Behavioral:  Negative for confusion and dysphoric mood.    All other systems reviewed and are negative.      Objective:    Vitals:  Vitals:    11/27/24 0944   BP: 102/64   Pulse: 87   Resp: 15   SpO2: 98%   Weight: 104.9 kg (231 lb 6 oz)   Height: 6' 1" (1.854 m)       Physical Exam  Vitals reviewed.   Constitutional:       General: He is not in acute distress.  HENT:      Head: Normocephalic and atraumatic.   Eyes:      Pupils: Pupils are equal, round, and reactive to light.   Cardiovascular:      Rate and Rhythm: Normal rate and regular rhythm.      Heart sounds: No murmur heard.     No friction rub.   Pulmonary:      Effort: Pulmonary effort is normal.      Breath sounds: Normal breath sounds.   Abdominal:      General: Bowel sounds are normal. There is no distension.      Palpations: Abdomen is soft.      Tenderness: There is no abdominal tenderness.   Musculoskeletal:      Cervical back: Neck supple.   Skin:     General: Skin is warm and dry.      Findings: No rash.   Psychiatric:         Behavior: Behavior normal.             Demarco Naranjo MD  Family Medicine        "

## 2024-12-16 ENCOUNTER — PATIENT OUTREACH (OUTPATIENT)
Dept: PSYCHIATRY | Facility: CLINIC | Age: 44
End: 2024-12-16
Payer: COMMERCIAL

## 2025-04-05 DIAGNOSIS — G47.26 SHIFT WORK SLEEP DISORDER: ICD-10-CM

## 2025-04-05 RX ORDER — TRAZODONE HYDROCHLORIDE 100 MG/1
100 TABLET ORAL NIGHTLY
Qty: 90 TABLET | Refills: 2 | Status: SHIPPED | OUTPATIENT
Start: 2025-04-05

## 2025-04-05 NOTE — TELEPHONE ENCOUNTER
No care due was identified.  [facility] Embedded Care Due Messages. Reference number: [id number].   4/05/2025 5:30:16 AM CDT

## 2025-04-06 NOTE — TELEPHONE ENCOUNTER
Refill Decision Note   Yung Joy  is requesting a refill authorization.  Brief Assessment and Rationale for Refill:  Approve     Medication Therapy Plan:         Comments:     Note composed:11:08 PM 04/05/2025

## 2025-04-16 ENCOUNTER — PATIENT MESSAGE (OUTPATIENT)
Dept: FAMILY MEDICINE | Facility: CLINIC | Age: 45
End: 2025-04-16
Payer: COMMERCIAL

## 2025-04-21 ENCOUNTER — OFFICE VISIT (OUTPATIENT)
Dept: FAMILY MEDICINE | Facility: CLINIC | Age: 45
End: 2025-04-21
Payer: COMMERCIAL

## 2025-04-21 ENCOUNTER — TELEPHONE (OUTPATIENT)
Dept: FAMILY MEDICINE | Facility: CLINIC | Age: 45
End: 2025-04-21

## 2025-04-21 DIAGNOSIS — R45.89 DEPRESSED MOOD: ICD-10-CM

## 2025-04-21 DIAGNOSIS — R53.83 FATIGUE, UNSPECIFIED TYPE: Primary | ICD-10-CM

## 2025-04-21 DIAGNOSIS — R68.82 LIBIDO, DECREASED: ICD-10-CM

## 2025-04-21 NOTE — PROGRESS NOTES
THIS DOCUMENT WAS MADE IN PART WITH VOICE RECOGNITION SOFTWARE.  OCCASIONALLY THIS SOFTWARE WILL MISINTERPRET WORDS OR PHRASES.    Assessment and Plan:    1. Fatigue, unspecified type  Testosterone    Methylmalonic Acid, Serum    Vitamin B12    Sedimentation rate    C-Reactive Protein    VICTOR MANUEL Screen w/Reflex      2. Depressed mood  Testosterone      3. Libido, decreased  Testosterone            Assessment & Plan    PLAN SUMMARY:   Order comprehensive lab panel including testosterone, B12 levels, inflammatory markers, and autoimmune disease screening   Schedule morning lab draw between 7-9 AM   Order testosterone lab test to evaluate cause of decreased libido   Continue current use of sleeping pills for insomnia management   Follow up after lab results to discuss findings and potential treatment plan    MAJOR DEPRESSIVE DISORDER:   Explained symptoms associated with low testosterone including mood changes.   Noted the patient's reports of depression symptoms and lack of motivation.   Observed that the patient's symptoms coincide with weight loss and loss of appetite.   Acknowledged that the symptoms match low testosterone levels, which can affect mood.   Ordered labs including testosterone levels to evaluate the cause of depression.    INSOMNIA:   Noted the patient's reports of difficulty falling asleep and staying asleep without medication.   Evaluated that the patient sleeps for 6 hours with sleeping pills unless disturbed.   Confirmed that the patient is currently using sleeping pills to manage insomnia.    DECREASED LIBIDO:   Explained symptoms associated with low testosterone including decreased libido.   Noted the patient's reports of decreased interest in sex.   Acknowledged that decreased libido can be a symptom of low testosterone.   Ordered testosterone lab test to evaluate the cause of decreased libido.    TESTICULAR DYSFUNCTION:   Explained symptoms associated with low testosterone including decreased  energy levels, focus, concentration, motivation, and exercise recovery.   Discussed that low testosterone is common in men, especially those 40 years and older.   Ordered comprehensive lab panel including testosterone, B12 levels, inflammatory markers, and autoimmune disease screening to evaluate underlying causes.    FOLLOW-UP:   Instructed the patient to follow up after lab results to discuss findings and potential treatment plan.   Advised the patient to contact the office to schedule morning lab draw between 7-9 AM.             ______________________________________________________________________  Subjective:    Chief Complaint:  Question about low T      HPI:  Yung is a 44 y.o. year old     The patient location is: [place]    Visit type: Audiovisual    Face to Face time with patient: 15 min  20 minutes of total time spent on the encounter, which includes face to face time and non-face to face time preparing to see the patient (eg, review of tests), Obtaining and/or reviewing separately obtained history, Documenting clinical information in the electronic or other health record, Independently interpreting results (not separately reported) and communicating results to the patient/family/caregiver, or Care coordination (not separately reported).     Each patient to whom he or she provides medical services by telemedicine is:  (1) informed of the relationship between the physician and patient and the respective role of any other health care provider with respect to management of the patient; and (2) notified that he or she may decline to receive medical services by telemedicine and may withdraw from such care at any time.    Notes:     History of Present Illness    CHIEF COMPLAINT:  Yung presents today to discuss concerns about low testosterone.    HISTORY OF PRESENT ILLNESS:  He reports symptoms concerning for low testosterone including depression, lack of motivation, and decreased libido despite maintaining erectile  function. He has difficulty completing tasks, often feeling unable to continue when starting activities. These symptoms began coinciding with loss of appetite.    SLEEP:  He reports difficulty with sleep initiation and maintenance, sleeping approximately 6 hours with the use of sleeping medication.      ROS:  ROS as indicated in HPI.           Eye pathology  History of traumatic cataract, ruptured globe, episcleritis, corneal perforation, aphakia     Tobacco Dep.   Quit 18 - 20 years ago   4 py total    Insomnia  Rx : Trazodone 100 mg PRN (works well)     Ess Hypertension  Rx : Benicar 20  Normotensive at home       Past Medical History:  Past Medical History:   Diagnosis Date    Aphakia of right eye     Corneal perforation 9/8/13    right eye    Episcleritis of left eye 9/25/14    Pupil irregularity     Ruptured globe of right eye 9/8/13    Traumatic cataract of right eye        Past Surgical History:  Past Surgical History:   Procedure Laterality Date    CATARACT EXTRACTION      aphakia right eye    EYE SURGERY  2013    REPAIR OF RETINAL DETACHMENT WITH VITRECTOMY Right 8/7/2023    Procedure: REPAIR RETINAL DETACHMENT WITH 25G VITRECTOMY AND GAS EXCHANGE: C3F8;  Surgeon: Han Laguerre MD;  Location: [address];  Service: Ophthalmology;  Laterality: Right;    TONSILLECTOMY, ADENOIDECTOMY  y-28       Family History:  Family History   Problem Relation Name Age of Onset    Cancer Mother China Martinez         sarcoma    Cancer Maternal Grandmother Erin Martinez     Cancer Paternal Grandmother Shweta Schexnaydre     Diabetes Paternal Grandmother Shweta Schexnaydre     Diabetes Paternal Grandfather Urban Schexnaydre        Social History:  Social History     Socioeconomic History    Marital status:      Spouse name: Maria L    Number of children: 3   Tobacco Use    Smoking status: Former     Current packs/day: 1.00     Average packs/day: 1 pack/day for 4.0 years (4.0 ttl pk-yrs)     Types: Cigarettes     "Smokeless tobacco: Never   Substance and Sexual Activity    Alcohol use: Yes     Alcohol/week: 1.0 standard drink of alcohol     Types: 1 Drinks containing 0.5 oz of alcohol per week     Comment: occasionaly    Drug use: No    Sexual activity: Yes     Partners: Female     Birth control/protection: None   Social History Narrative    Job : [employer] :      Diet : Normal     Exercise : "trying, but I work shift work"      Social Drivers of Health     Financial Resource Strain: Patient Declined (4/18/2025)    Overall Financial Resource Strain (CARDIA)     Difficulty of Paying Living Expenses: Patient declined   Food Insecurity: Patient Declined (4/18/2025)    Hunger Vital Sign     Worried About Running Out of Food in the Last Year: Patient declined     Ran Out of Food in the Last Year: Patient declined   Transportation Needs: Patient Declined (4/18/2025)    PRAPARE - Transportation     Lack of Transportation (Medical): Patient declined     Lack of Transportation (Non-Medical): Patient declined   Physical Activity: Patient Declined (4/18/2025)    Exercise Vital Sign     Days of Exercise per Week: Patient declined     Minutes of Exercise per Session: Patient declined   Stress: Patient Declined (4/18/2025)    [employer] - Occupational Stress Questionnaire     Feeling of Stress : Patient declined   Housing Stability: Patient Declined (4/18/2025)    Housing Stability Vital Sign     Unable to Pay for Housing in the Last Year: Patient declined     Homeless in the Last Year: Patient declined       Medications:  Medications Ordered Prior to Encounter[1]    Allergies:  Patient has no known allergies.    Immunizations:  Immunization History   Administered Date(s) Administered    Influenza - Trivalent - Fluarix, Flulaval, Fluzone, Afluria - PF 11/27/2024    Tdap 12/09/2022       Review of Systems:  Review of Systems   Constitutional:  Negative for activity change.   HENT:  Negative " for hearing loss and trouble swallowing.    Eyes:  Negative for discharge.   Respiratory:  Negative for chest tightness and wheezing.    Cardiovascular:  Negative for chest pain and palpitations.   Gastrointestinal:  Negative for constipation, diarrhea and vomiting.   Genitourinary:  Negative for difficulty urinating and hematuria.   Neurological:  Negative for headaches.   Psychiatric/Behavioral:  Negative for dysphoric mood.    All other systems reviewed and are negative.      Objective:    Vitals:  There were no vitals filed for this visit.    Physical Exam  Vitals reviewed.   Constitutional:       Appearance: He is well-developed.   HENT:      Head: Normocephalic and atraumatic.   Pulmonary:      Effort: Pulmonary effort is normal. No respiratory distress.   Musculoskeletal:      Cervical back: Normal range of motion.   Psychiatric:         Behavior: Behavior normal.         Thought Content: Thought content normal.         Judgment: Judgment normal.             Demarco Naranjo MD  Family Medicine         [1]   Current Outpatient Medications on File Prior to Visit   Medication Sig Dispense Refill    olmesartan (BENICAR) 20 MG tablet Take 0.5 tablets (10 mg total) by mouth once daily.      traZODone (DESYREL) 100 MG tablet Take 1 tablet (100 mg total) by mouth every evening. 90 tablet 2     No current facility-administered medications on file prior to visit.

## 2025-04-21 NOTE — TELEPHONE ENCOUNTER
----- Message from Demarco Naranjo MD sent at 4/21/2025  1:13 PM CDT -----  Call patient to schedule AM labs

## 2025-04-21 NOTE — TELEPHONE ENCOUNTER
Spoke to pt today, verified lab appt for Friday 4/25/2025, pt states this is earliest he is able to come in for labs.

## 2025-04-25 ENCOUNTER — LAB VISIT (OUTPATIENT)
Dept: LAB | Facility: HOSPITAL | Age: 45
End: 2025-04-25
Attending: FAMILY MEDICINE
Payer: COMMERCIAL

## 2025-04-25 DIAGNOSIS — R45.89 DEPRESSED MOOD: ICD-10-CM

## 2025-04-25 DIAGNOSIS — R68.82 LIBIDO, DECREASED: ICD-10-CM

## 2025-04-25 DIAGNOSIS — R53.83 FATIGUE, UNSPECIFIED TYPE: ICD-10-CM

## 2025-04-25 LAB
CRP SERPL-MCNC: 0.8 MG/L
ERYTHROCYTE [SEDIMENTATION RATE] IN BLOOD BY PHOTOMETRIC METHOD: 2 MM/HR
TESTOST SERPL-MCNC: 536 NG/DL (ref 304–1227)
VIT B12 SERPL-MCNC: 271 PG/ML (ref 210–950)

## 2025-04-25 PROCEDURE — 36415 COLL VENOUS BLD VENIPUNCTURE: CPT | Mod: PN

## 2025-04-25 PROCEDURE — 83921 ORGANIC ACID SINGLE QUANT: CPT

## 2025-04-25 PROCEDURE — 82607 VITAMIN B-12: CPT

## 2025-04-25 PROCEDURE — 84403 ASSAY OF TOTAL TESTOSTERONE: CPT

## 2025-04-25 PROCEDURE — 86038 ANTINUCLEAR ANTIBODIES: CPT

## 2025-04-25 PROCEDURE — 86140 C-REACTIVE PROTEIN: CPT

## 2025-04-25 PROCEDURE — 85652 RBC SED RATE AUTOMATED: CPT

## 2025-04-28 LAB — ANA (OHS): NORMAL

## 2025-04-29 LAB — W METHYLMALONIC ACID: 0.37 UMOL/L

## 2025-04-30 ENCOUNTER — RESULTS FOLLOW-UP (OUTPATIENT)
Dept: FAMILY MEDICINE | Facility: CLINIC | Age: 45
End: 2025-04-30

## 2025-08-05 ENCOUNTER — OFFICE VISIT (OUTPATIENT)
Dept: OPTOMETRY | Facility: CLINIC | Age: 45
End: 2025-08-05
Payer: COMMERCIAL

## 2025-08-05 DIAGNOSIS — H27.01 APHAKIA OF RIGHT EYE: ICD-10-CM

## 2025-08-05 DIAGNOSIS — H52.7 REFRACTIVE ERROR: ICD-10-CM

## 2025-08-05 DIAGNOSIS — Z46.0 CONTACT LENS/GLASSES FITTING: Primary | ICD-10-CM

## 2025-08-05 DIAGNOSIS — H59.819 CHORIORETINAL SCAR AFTER RETINAL DETACHMENT SURGERY: Primary | ICD-10-CM

## 2025-08-05 PROCEDURE — 92310 CONTACT LENS FITTING OU: CPT | Mod: CSM,,, | Performed by: OPTOMETRIST

## 2025-08-05 PROCEDURE — 92014 COMPRE OPH EXAM EST PT 1/>: CPT | Mod: S$GLB,,, | Performed by: OPTOMETRIST

## 2025-08-05 PROCEDURE — 99499 UNLISTED E&M SERVICE: CPT | Mod: ,,, | Performed by: OPTOMETRIST

## 2025-08-05 PROCEDURE — 99999 PR PBB SHADOW E&M-EST. PATIENT-LVL II: CPT | Mod: PBBFAC,,, | Performed by: OPTOMETRIST

## 2025-08-05 PROCEDURE — 92015 DETERMINE REFRACTIVE STATE: CPT | Mod: S$GLB,,, | Performed by: OPTOMETRIST

## 2025-08-05 PROCEDURE — 99999 PR PBB SHADOW E&M-EST. PATIENT-LVL III: CPT | Mod: PBBFAC,,, | Performed by: OPTOMETRIST

## 2025-08-05 NOTE — PROGRESS NOTES
Assessment /Plan     For exam results, see Encounter Report.    Contact lens/glasses fitting      Patient is here for a comprehensive eye exam and contact lens fit. See other exam visit with same encounter date 08/05/2025 for detailed exam information.

## 2025-08-05 NOTE — PROGRESS NOTES
HPI     Annual Exam     Additional comments: DLE 7-2024           Comments    Pt here today for ocular health exam with refraction to update specs &   clrx.     States no visual changes or complaints    Happy with current contacts -- wears Biofinity.      Denies any headaches or eye pain.    (+) rewetting gtts OU prn  (+) floaters OD occasionally -- no light flashes          Last edited by Grady Boyle, OD on 8/5/2025  2:45 PM.            Assessment /Plan     For exam results, see Encounter Report.    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 Aphakia of right eye  Discussed options  Reviewed monocular precautions     3. Refractive error  Dispensed updated spectacle Rx. Discussed various spectacle lens options. Discussed adaptation period to new specs.     Discussed cls options  Ordered biofinity toric trials, return for dispense

## 2025-08-11 ENCOUNTER — OFFICE VISIT (OUTPATIENT)
Dept: FAMILY MEDICINE | Facility: CLINIC | Age: 45
End: 2025-08-11
Payer: COMMERCIAL

## 2025-08-11 VITALS
HEIGHT: 73 IN | RESPIRATION RATE: 16 BRPM | SYSTOLIC BLOOD PRESSURE: 139 MMHG | DIASTOLIC BLOOD PRESSURE: 78 MMHG | OXYGEN SATURATION: 99 % | BODY MASS INDEX: 27.3 KG/M2 | WEIGHT: 206 LBS | HEART RATE: 75 BPM | TEMPERATURE: 98 F

## 2025-08-11 DIAGNOSIS — L72.3 SEBACEOUS CYST: Primary | ICD-10-CM

## 2025-08-11 PROCEDURE — 10060 I&D ABSCESS SIMPLE/SINGLE: CPT | Mod: S$GLB,,, | Performed by: FAMILY MEDICINE

## 2025-08-11 PROCEDURE — 99213 OFFICE O/P EST LOW 20 MIN: CPT | Mod: 25,S$GLB,, | Performed by: FAMILY MEDICINE

## 2025-08-11 PROCEDURE — 99999 PR PBB SHADOW E&M-EST. PATIENT-LVL III: CPT | Mod: PBBFAC,,, | Performed by: FAMILY MEDICINE

## 2025-08-11 RX ORDER — CEPHALEXIN 500 MG/1
500 CAPSULE ORAL EVERY 12 HOURS
Qty: 10 CAPSULE | Refills: 0 | Status: SHIPPED | OUTPATIENT
Start: 2025-08-11

## 2025-08-14 ENCOUNTER — TELEPHONE (OUTPATIENT)
Dept: FAMILY MEDICINE | Facility: CLINIC | Age: 45
End: 2025-08-14
Payer: COMMERCIAL

## 2025-08-21 ENCOUNTER — OFFICE VISIT (OUTPATIENT)
Dept: FAMILY MEDICINE | Facility: CLINIC | Age: 45
End: 2025-08-21
Payer: COMMERCIAL

## 2025-08-21 ENCOUNTER — OFFICE VISIT (OUTPATIENT)
Dept: OPTOMETRY | Facility: CLINIC | Age: 45
End: 2025-08-21
Payer: COMMERCIAL

## 2025-08-21 DIAGNOSIS — Z97.3 WEARS CONTACT LENSES: Primary | ICD-10-CM

## 2025-08-21 DIAGNOSIS — L72.3 SEBACEOUS CYST: Primary | ICD-10-CM

## 2025-08-21 PROCEDURE — 99499 UNLISTED E&M SERVICE: CPT | Mod: S$GLB,,, | Performed by: OPTOMETRIST

## 2025-08-25 ENCOUNTER — OFFICE VISIT (OUTPATIENT)
Dept: FAMILY MEDICINE | Facility: CLINIC | Age: 45
End: 2025-08-25
Payer: COMMERCIAL

## 2025-08-25 VITALS
OXYGEN SATURATION: 99 % | SYSTOLIC BLOOD PRESSURE: 135 MMHG | RESPIRATION RATE: 16 BRPM | TEMPERATURE: 99 F | BODY MASS INDEX: 27.49 KG/M2 | HEART RATE: 80 BPM | DIASTOLIC BLOOD PRESSURE: 80 MMHG | HEIGHT: 73 IN | WEIGHT: 207.44 LBS

## 2025-08-25 DIAGNOSIS — L72.3 SEBACEOUS CYST: Primary | ICD-10-CM

## 2025-08-25 PROCEDURE — 99999 PR PBB SHADOW E&M-EST. PATIENT-LVL III: CPT | Mod: PBBFAC,,, | Performed by: FAMILY MEDICINE

## 2025-08-25 PROCEDURE — 99213 OFFICE O/P EST LOW 20 MIN: CPT | Mod: S$GLB,,, | Performed by: FAMILY MEDICINE

## 2025-08-29 ENCOUNTER — OFFICE VISIT (OUTPATIENT)
Dept: FAMILY MEDICINE | Facility: CLINIC | Age: 45
End: 2025-08-29
Payer: COMMERCIAL

## 2025-08-29 VITALS
HEART RATE: 80 BPM | OXYGEN SATURATION: 99 % | BODY MASS INDEX: 27.49 KG/M2 | SYSTOLIC BLOOD PRESSURE: 132 MMHG | WEIGHT: 207.44 LBS | DIASTOLIC BLOOD PRESSURE: 78 MMHG | TEMPERATURE: 98 F | RESPIRATION RATE: 16 BRPM | HEIGHT: 73 IN

## 2025-08-29 DIAGNOSIS — L72.3 SEBACEOUS CYST: Primary | ICD-10-CM

## 2025-08-29 PROCEDURE — 99999 PR PBB SHADOW E&M-EST. PATIENT-LVL III: CPT | Mod: PBBFAC,,, | Performed by: FAMILY MEDICINE

## 2025-09-02 ENCOUNTER — OFFICE VISIT (OUTPATIENT)
Dept: FAMILY MEDICINE | Facility: CLINIC | Age: 45
End: 2025-09-02
Payer: COMMERCIAL

## 2025-09-02 VITALS
HEIGHT: 73 IN | OXYGEN SATURATION: 95 % | SYSTOLIC BLOOD PRESSURE: 130 MMHG | BODY MASS INDEX: 28.1 KG/M2 | WEIGHT: 212 LBS | HEART RATE: 84 BPM | DIASTOLIC BLOOD PRESSURE: 80 MMHG

## 2025-09-02 DIAGNOSIS — L72.3 SEBACEOUS CYST: Primary | ICD-10-CM

## 2025-09-02 PROCEDURE — 99999 PR PBB SHADOW E&M-EST. PATIENT-LVL III: CPT | Mod: PBBFAC,,, | Performed by: FAMILY MEDICINE

## 2025-09-02 PROCEDURE — 99213 OFFICE O/P EST LOW 20 MIN: CPT | Mod: S$GLB,,, | Performed by: FAMILY MEDICINE

## (undated) DEVICE — SYRINGE 30CC LL W/O NDL

## (undated) DEVICE — KIT GREY EYE

## (undated) DEVICE — SYR 10CC LUER LOCK

## (undated) DEVICE — NDL 22GA X1 1/2 REG BEVEL

## (undated) DEVICE — SKINMARKER & RULER REGULAR X-F

## (undated) DEVICE — PACK TOTAL PLUS 25G VITRECTOMY

## (undated) DEVICE — COVER PROXIMA MAYO STAND

## (undated) DEVICE — BACKFLUSH 25GA SOFT-TIP DISP

## (undated) DEVICE — CONTAINER SPECIMEN STRL 4OZ

## (undated) DEVICE — GOWN SURGICAL X-LARGE

## (undated) DEVICE — STRIP MEDI WND CLSR 1/2X4IN

## (undated) DEVICE — PACK INSTRUMENT COVER DISPO

## (undated) DEVICE — NDL HYPO A BEVEL 30X1/2

## (undated) DEVICE — NEEDLE HYPODERMIC HUB LUER LOC

## (undated) DEVICE — SUT 7/0 18IN COATED VICRYL

## (undated) DEVICE — FORCEP GRIESHABER MAXGRIP 25G

## (undated) DEVICE — KNIFE OPHTH MICRO UNITOME 5MM

## (undated) DEVICE — SYR 1CC TB SG 27GX1/2

## (undated) DEVICE — HOLDER TUBE

## (undated) DEVICE — SOL BALANCED SALT 500ML

## (undated) DEVICE — FORCEP GRASPING 25GA SMOOTH

## (undated) DEVICE — SHIELD FOX W/GARTER

## (undated) DEVICE — SOL BSS BALANCED SALT

## (undated) DEVICE — SOL BETADINE 5%

## (undated) DEVICE — PROBE ILLUM FLEX CURVE LASER

## (undated) DEVICE — CORD FOR BIPOLAR FORCEPS 12

## (undated) DEVICE — TRAY MUSCLE LID EYE

## (undated) DEVICE — LENS VITRCTMY OPHTH 30DEG 59DE

## (undated) DEVICE — DRESSING EYE OVAL LF

## (undated) DEVICE — DRAPE THREE-QTR REINF 53X77IN

## (undated) DEVICE — SYR DISP LL 5CC

## (undated) DEVICE — COVER MAYO STAND REINFRCD 30

## (undated) DEVICE — SOL WATER STRL IRR 1000ML